# Patient Record
Sex: FEMALE | Race: OTHER | NOT HISPANIC OR LATINO
[De-identification: names, ages, dates, MRNs, and addresses within clinical notes are randomized per-mention and may not be internally consistent; named-entity substitution may affect disease eponyms.]

---

## 2017-09-29 ENCOUNTER — LABORATORY RESULT (OUTPATIENT)
Age: 57
End: 2017-09-29

## 2017-09-29 ENCOUNTER — OUTPATIENT (OUTPATIENT)
Dept: OUTPATIENT SERVICES | Facility: HOSPITAL | Age: 57
LOS: 1 days | End: 2017-09-29
Payer: SELF-PAY

## 2017-09-29 ENCOUNTER — APPOINTMENT (OUTPATIENT)
Dept: INTERNAL MEDICINE | Facility: CLINIC | Age: 57
End: 2017-09-29

## 2017-09-29 VITALS
SYSTOLIC BLOOD PRESSURE: 140 MMHG | DIASTOLIC BLOOD PRESSURE: 70 MMHG | WEIGHT: 170 LBS | BODY MASS INDEX: 39.34 KG/M2 | HEIGHT: 55 IN

## 2017-09-29 DIAGNOSIS — I10 ESSENTIAL (PRIMARY) HYPERTENSION: ICD-10-CM

## 2017-09-29 DIAGNOSIS — Z00.00 ENCOUNTER FOR GENERAL ADULT MEDICAL EXAMINATION W/OUT ABNORMAL FINDINGS: ICD-10-CM

## 2017-09-29 DIAGNOSIS — Z78.9 OTHER SPECIFIED HEALTH STATUS: ICD-10-CM

## 2017-09-29 PROCEDURE — G0463: CPT

## 2017-09-29 PROCEDURE — 80061 LIPID PANEL: CPT

## 2017-09-29 PROCEDURE — 90715 TDAP VACCINE 7 YRS/> IM: CPT

## 2017-09-29 PROCEDURE — G0008: CPT

## 2017-09-29 PROCEDURE — 90471 IMMUNIZATION ADMIN: CPT

## 2017-09-29 PROCEDURE — 80053 COMPREHEN METABOLIC PANEL: CPT

## 2017-09-29 PROCEDURE — 86803 HEPATITIS C AB TEST: CPT

## 2017-09-29 PROCEDURE — 90688 IIV4 VACCINE SPLT 0.5 ML IM: CPT

## 2017-09-29 PROCEDURE — 84443 ASSAY THYROID STIM HORMONE: CPT

## 2017-09-29 PROCEDURE — 85027 COMPLETE CBC AUTOMATED: CPT

## 2017-09-30 ENCOUNTER — INPATIENT (INPATIENT)
Facility: HOSPITAL | Age: 57
LOS: 1 days | Discharge: HOME | DRG: 842 | End: 2017-10-02
Attending: HOSPITALIST | Admitting: HOSPITALIST
Payer: MEDICAID

## 2017-09-30 VITALS
TEMPERATURE: 99 F | SYSTOLIC BLOOD PRESSURE: 127 MMHG | OXYGEN SATURATION: 98 % | DIASTOLIC BLOOD PRESSURE: 81 MMHG | HEART RATE: 77 BPM | RESPIRATION RATE: 20 BRPM

## 2017-09-30 DIAGNOSIS — D72.829 ELEVATED WHITE BLOOD CELL COUNT, UNSPECIFIED: ICD-10-CM

## 2017-09-30 DIAGNOSIS — D72.828 OTHER ELEVATED WHITE BLOOD CELL COUNT: ICD-10-CM

## 2017-09-30 DIAGNOSIS — Z29.9 ENCOUNTER FOR PROPHYLACTIC MEASURES, UNSPECIFIED: ICD-10-CM

## 2017-09-30 DIAGNOSIS — I10 ESSENTIAL (PRIMARY) HYPERTENSION: ICD-10-CM

## 2017-09-30 DIAGNOSIS — Z98.891 HISTORY OF UTERINE SCAR FROM PREVIOUS SURGERY: Chronic | ICD-10-CM

## 2017-09-30 DIAGNOSIS — C95.00 ACUTE LEUKEMIA OF UNSPECIFIED CELL TYPE NOT HAVING ACHIEVED REMISSION: ICD-10-CM

## 2017-09-30 LAB
ALBUMIN SERPL ELPH-MCNC: 4.6 G/DL — SIGNIFICANT CHANGE UP (ref 3.3–5)
ALBUMIN SERPL ELPH-MCNC: 4.6 G/DL — SIGNIFICANT CHANGE UP (ref 3.3–5)
ALP SERPL-CCNC: 69 U/L — SIGNIFICANT CHANGE UP (ref 40–120)
ALP SERPL-CCNC: 69 U/L — SIGNIFICANT CHANGE UP (ref 40–120)
ALT FLD-CCNC: 12 U/L — SIGNIFICANT CHANGE UP (ref 10–45)
ALT FLD-CCNC: 15 U/L RC — SIGNIFICANT CHANGE UP (ref 10–45)
ANION GAP SERPL CALC-SCNC: 14 MMOL/L — SIGNIFICANT CHANGE UP (ref 5–17)
ANION GAP SERPL CALC-SCNC: 18 MMOL/L — HIGH (ref 5–17)
APPEARANCE UR: CLEAR — SIGNIFICANT CHANGE UP
APTT BLD: 25.6 SEC — LOW (ref 27.5–37.4)
AST SERPL-CCNC: 22 U/L — SIGNIFICANT CHANGE UP (ref 10–40)
AST SERPL-CCNC: 33 U/L — SIGNIFICANT CHANGE UP (ref 10–40)
BASE EXCESS BLDV CALC-SCNC: 1.9 MMOL/L — SIGNIFICANT CHANGE UP (ref -2–2)
BASOPHILS # BLD AUTO: 0.9 K/UL — HIGH (ref 0–0.2)
BASOPHILS NFR BLD AUTO: 1 % — SIGNIFICANT CHANGE UP (ref 0–2)
BILIRUB SERPL-MCNC: 0.3 MG/DL — SIGNIFICANT CHANGE UP (ref 0.2–1.2)
BILIRUB SERPL-MCNC: 0.4 MG/DL — SIGNIFICANT CHANGE UP (ref 0.2–1.2)
BILIRUB UR-MCNC: NEGATIVE — SIGNIFICANT CHANGE UP
BUN SERPL-MCNC: 12 MG/DL — SIGNIFICANT CHANGE UP (ref 7–23)
BUN SERPL-MCNC: 14 MG/DL — SIGNIFICANT CHANGE UP (ref 7–23)
CA-I SERPL-SCNC: 1.25 MMOL/L — SIGNIFICANT CHANGE UP (ref 1.12–1.3)
CALCIUM SERPL-MCNC: 10 MG/DL — SIGNIFICANT CHANGE UP (ref 8.4–10.5)
CALCIUM SERPL-MCNC: 9.9 MG/DL — SIGNIFICANT CHANGE UP (ref 8.4–10.5)
CHLORIDE BLDV-SCNC: 106 MMOL/L — SIGNIFICANT CHANGE UP (ref 96–108)
CHLORIDE SERPL-SCNC: 100 MMOL/L — SIGNIFICANT CHANGE UP (ref 96–108)
CHLORIDE SERPL-SCNC: 101 MMOL/L — SIGNIFICANT CHANGE UP (ref 96–108)
CHOLEST SERPL-MCNC: 118 MG/DL — SIGNIFICANT CHANGE UP (ref 10–199)
CO2 BLDV-SCNC: 29 MMOL/L — SIGNIFICANT CHANGE UP (ref 22–30)
CO2 SERPL-SCNC: 22 MMOL/L — SIGNIFICANT CHANGE UP (ref 22–31)
CO2 SERPL-SCNC: 26 MMOL/L — SIGNIFICANT CHANGE UP (ref 22–31)
COLOR SPEC: COLORLESS — SIGNIFICANT CHANGE UP
COMMENT - URINE: SIGNIFICANT CHANGE UP
CREAT SERPL-MCNC: 0.7 MG/DL — SIGNIFICANT CHANGE UP (ref 0.5–1.3)
CREAT SERPL-MCNC: 0.74 MG/DL — SIGNIFICANT CHANGE UP (ref 0.5–1.3)
DIFF PNL FLD: NEGATIVE — SIGNIFICANT CHANGE UP
EOSINOPHIL # BLD AUTO: 0.7 K/UL — HIGH (ref 0–0.5)
EOSINOPHIL NFR BLD AUTO: 7 % — HIGH (ref 0–6)
EPI CELLS # UR: SIGNIFICANT CHANGE UP /HPF
FIBRINOGEN PPP-MCNC: 538 MG/DL — HIGH (ref 310–510)
GAS PNL BLDV: 139 MMOL/L — SIGNIFICANT CHANGE UP (ref 136–145)
GAS PNL BLDV: SIGNIFICANT CHANGE UP
GIANT PLATELETS BLD QL SMEAR: PRESENT — SIGNIFICANT CHANGE UP
GLUCOSE BLDV-MCNC: 99 MG/DL — SIGNIFICANT CHANGE UP (ref 70–99)
GLUCOSE SERPL-MCNC: 67 MG/DL — LOW (ref 70–99)
GLUCOSE SERPL-MCNC: 91 MG/DL — SIGNIFICANT CHANGE UP (ref 70–99)
GLUCOSE UR QL: NEGATIVE — SIGNIFICANT CHANGE UP
HCO3 BLDV-SCNC: 27 MMOL/L — SIGNIFICANT CHANGE UP (ref 21–29)
HCT VFR BLD CALC: 36.2 % — SIGNIFICANT CHANGE UP (ref 34.5–45)
HCT VFR BLD CALC: 36.3 % — SIGNIFICANT CHANGE UP (ref 34.5–45)
HCT VFR BLDA CALC: 34 % — LOW (ref 39–50)
HCV AB S/CO SERPL IA: 0.12 S/CO — SIGNIFICANT CHANGE UP
HCV AB SERPL-IMP: SIGNIFICANT CHANGE UP
HDLC SERPL-MCNC: 41 MG/DL — SIGNIFICANT CHANGE UP (ref 40–125)
HGB BLD CALC-MCNC: 11.2 G/DL — LOW (ref 11.5–15.5)
HGB BLD-MCNC: 11.2 G/DL — LOW (ref 11.5–15.5)
HGB BLD-MCNC: 11.5 G/DL — SIGNIFICANT CHANGE UP (ref 11.5–15.5)
INR BLD: 1.15 RATIO — SIGNIFICANT CHANGE UP (ref 0.88–1.16)
KETONES UR-MCNC: NEGATIVE — SIGNIFICANT CHANGE UP
LACTATE BLDV-MCNC: 1.3 MMOL/L — SIGNIFICANT CHANGE UP (ref 0.7–2)
LDH SERPL L TO P-CCNC: 794 U/L — HIGH (ref 50–242)
LEUKOCYTE ESTERASE UR-ACNC: NEGATIVE — SIGNIFICANT CHANGE UP
LIPID PNL WITH DIRECT LDL SERPL: 49 MG/DL — SIGNIFICANT CHANGE UP
LYMPHOCYTES # BLD AUTO: 13.9 K/UL — HIGH (ref 1–3.3)
LYMPHOCYTES # BLD AUTO: 4 % — LOW (ref 13–44)
MAGNESIUM SERPL-MCNC: 2 MG/DL — SIGNIFICANT CHANGE UP (ref 1.6–2.6)
MANUAL SMEAR VERIFICATION: SIGNIFICANT CHANGE UP
MCHC RBC-ENTMCNC: 27.6 PG — SIGNIFICANT CHANGE UP (ref 27–34)
MCHC RBC-ENTMCNC: 27.8 PG — SIGNIFICANT CHANGE UP (ref 27–34)
MCHC RBC-ENTMCNC: 30.9 GM/DL — LOW (ref 32–36)
MCHC RBC-ENTMCNC: 31.8 GM/DL — LOW (ref 32–36)
MCV RBC AUTO: 86.6 FL — SIGNIFICANT CHANGE UP (ref 80–100)
MCV RBC AUTO: 89.8 FL — SIGNIFICANT CHANGE UP (ref 80–100)
METAMYELOCYTES # FLD: 10 % — HIGH (ref 0–0)
MONOCYTES # BLD AUTO: 1.4 K/UL — HIGH (ref 0–0.9)
MONOCYTES NFR BLD AUTO: 2 % — SIGNIFICANT CHANGE UP (ref 2–14)
MYELOCYTES NFR BLD: 13 % — HIGH (ref 0–0)
NEUTROPHILS # BLD AUTO: 88 K/UL — HIGH (ref 1.8–7.4)
NEUTROPHILS NFR BLD AUTO: 48 % — SIGNIFICANT CHANGE UP (ref 43–77)
NEUTS BAND # BLD: 12 % — HIGH (ref 0–8)
NITRITE UR-MCNC: NEGATIVE — SIGNIFICANT CHANGE UP
PCO2 BLDV: 48 MMHG — SIGNIFICANT CHANGE UP (ref 35–50)
PH BLDV: 7.37 — SIGNIFICANT CHANGE UP (ref 7.35–7.45)
PH UR: 6 — SIGNIFICANT CHANGE UP (ref 5–8)
PHOSPHATE SERPL-MCNC: 3.7 MG/DL — SIGNIFICANT CHANGE UP (ref 2.5–4.5)
PLAT MORPH BLD: ABNORMAL
PLAT MORPH BLD: SIGNIFICANT CHANGE UP
PLATELET # BLD AUTO: 452 K/UL — HIGH (ref 150–400)
PLATELET # BLD AUTO: 661 K/UL — HIGH (ref 150–400)
PO2 BLDV: 27 MMHG — SIGNIFICANT CHANGE UP (ref 25–45)
POTASSIUM BLDV-SCNC: 4.4 MMOL/L — SIGNIFICANT CHANGE UP (ref 3.5–5)
POTASSIUM SERPL-MCNC: 4.4 MMOL/L — SIGNIFICANT CHANGE UP (ref 3.5–5.3)
POTASSIUM SERPL-MCNC: 4.7 MMOL/L — SIGNIFICANT CHANGE UP (ref 3.5–5.3)
POTASSIUM SERPL-SCNC: 4.4 MMOL/L — SIGNIFICANT CHANGE UP (ref 3.5–5.3)
POTASSIUM SERPL-SCNC: 4.7 MMOL/L — SIGNIFICANT CHANGE UP (ref 3.5–5.3)
PROMYELOCYTES # FLD: 3 % — HIGH (ref 0–0)
PROT SERPL-MCNC: 8 G/DL — SIGNIFICANT CHANGE UP (ref 6–8.3)
PROT SERPL-MCNC: 8.4 G/DL — HIGH (ref 6–8.3)
PROT UR-MCNC: NEGATIVE — SIGNIFICANT CHANGE UP
PROTHROM AB SERPL-ACNC: 12.6 SEC — SIGNIFICANT CHANGE UP (ref 9.8–12.7)
RBC # BLD: 4.03 M/UL — SIGNIFICANT CHANGE UP (ref 3.8–5.2)
RBC # BLD: 4.19 M/UL — SIGNIFICANT CHANGE UP (ref 3.8–5.2)
RBC # FLD: 15.8 % — HIGH (ref 10.3–14.5)
RBC # FLD: 18.4 % — HIGH (ref 10.3–14.5)
RBC BLD AUTO: SIGNIFICANT CHANGE UP
RBC BLD AUTO: SIGNIFICANT CHANGE UP
RBC CASTS # UR COMP ASSIST: SIGNIFICANT CHANGE UP /HPF (ref 0–2)
SAO2 % BLDV: 42 % — LOW (ref 67–88)
SODIUM SERPL-SCNC: 140 MMOL/L — SIGNIFICANT CHANGE UP (ref 135–145)
SODIUM SERPL-SCNC: 141 MMOL/L — SIGNIFICANT CHANGE UP (ref 135–145)
SP GR SPEC: 1 — LOW (ref 1.01–1.02)
TOTAL CHOLESTEROL/HDL RATIO MEASUREMENT: 2.9 RATIO — LOW (ref 3.3–7.1)
TRIGL SERPL-MCNC: 142 MG/DL — SIGNIFICANT CHANGE UP (ref 10–149)
TSH SERPL-MCNC: 2.03 UIU/ML — SIGNIFICANT CHANGE UP (ref 0.27–4.2)
URATE SERPL-MCNC: 7.1 MG/DL — HIGH (ref 2.5–7)
UROBILINOGEN FLD QL: NEGATIVE — SIGNIFICANT CHANGE UP
WBC # BLD: 105 K/UL — CRITICAL HIGH (ref 3.8–10.5)
WBC # BLD: 83.45 K/UL — CRITICAL HIGH (ref 3.8–10.5)
WBC # FLD AUTO: 105 K/UL — CRITICAL HIGH (ref 3.8–10.5)
WBC # FLD AUTO: 83.45 K/UL — CRITICAL HIGH (ref 3.8–10.5)
WBC UR QL: SIGNIFICANT CHANGE UP /HPF (ref 0–5)

## 2017-09-30 PROCEDURE — 99223 1ST HOSP IP/OBS HIGH 75: CPT | Mod: GC

## 2017-09-30 PROCEDURE — 99285 EMERGENCY DEPT VISIT HI MDM: CPT

## 2017-09-30 PROCEDURE — 99254 IP/OBS CNSLTJ NEW/EST MOD 60: CPT | Mod: GC

## 2017-09-30 PROCEDURE — 88189 FLOWCYTOMETRY/READ 16 & >: CPT

## 2017-09-30 RX ORDER — ALLOPURINOL 300 MG
300 TABLET ORAL DAILY
Qty: 0 | Refills: 0 | Status: DISCONTINUED | OUTPATIENT
Start: 2017-09-30 | End: 2017-10-01

## 2017-09-30 RX ORDER — ENOXAPARIN SODIUM 100 MG/ML
40 INJECTION SUBCUTANEOUS EVERY 24 HOURS
Qty: 0 | Refills: 0 | Status: DISCONTINUED | OUTPATIENT
Start: 2017-09-30 | End: 2017-10-02

## 2017-09-30 RX ORDER — SODIUM CHLORIDE 9 MG/ML
1000 INJECTION INTRAMUSCULAR; INTRAVENOUS; SUBCUTANEOUS
Qty: 0 | Refills: 0 | Status: DISCONTINUED | OUTPATIENT
Start: 2017-09-30 | End: 2017-10-02

## 2017-09-30 RX ORDER — SODIUM CHLORIDE 9 MG/ML
1000 INJECTION INTRAMUSCULAR; INTRAVENOUS; SUBCUTANEOUS ONCE
Qty: 0 | Refills: 0 | Status: COMPLETED | OUTPATIENT
Start: 2017-09-30 | End: 2017-09-30

## 2017-09-30 RX ORDER — LISINOPRIL 2.5 MG/1
1 TABLET ORAL
Qty: 0 | Refills: 0 | COMMUNITY

## 2017-09-30 RX ORDER — CAPTOPRIL 12.5 MG/1
0 TABLET ORAL
Qty: 0 | Refills: 0 | COMMUNITY

## 2017-09-30 RX ORDER — LISINOPRIL 2.5 MG/1
40 TABLET ORAL DAILY
Qty: 0 | Refills: 0 | Status: DISCONTINUED | OUTPATIENT
Start: 2017-09-30 | End: 2017-10-02

## 2017-09-30 RX ADMIN — ENOXAPARIN SODIUM 40 MILLIGRAM(S): 100 INJECTION SUBCUTANEOUS at 21:34

## 2017-09-30 RX ADMIN — SODIUM CHLORIDE 1000 MILLILITER(S): 9 INJECTION INTRAMUSCULAR; INTRAVENOUS; SUBCUTANEOUS at 11:38

## 2017-09-30 RX ADMIN — SODIUM CHLORIDE 75 MILLILITER(S): 9 INJECTION INTRAMUSCULAR; INTRAVENOUS; SUBCUTANEOUS at 21:34

## 2017-09-30 NOTE — ED ADULT NURSE NOTE - CHPI ED SYMPTOMS NEG
no fever/no decreased eating/drinking/no numbness/no pain/no tingling/no weakness/no nausea/no vomiting/no chills/no dizziness

## 2017-09-30 NOTE — CHART NOTE - NSCHARTNOTEFT_GEN_A_CORE
d/w Dr. Duggan of pathology. Preliminary flow cytometry results seem more consistent with CML. Normal fibrinogen noted. Plan for BM biopsy on Monday.

## 2017-09-30 NOTE — ED PROVIDER NOTE - OBJECTIVE STATEMENT
57 y.o. female with HTN, referred from the [facility] for elevated WBC on CBC. Pt was found to have a WBC of 83.45, no diff available. The patient has no acute complaints at this time. Had a headache earlier on in the week but has not had any issues since then. The pt denies fever, chills, night sweats, cough, nasal congestion, dyspnea, chest pain, palpations, nausea/vomiting, abd. pain, diarrhea, constipation, dysuria, urinary frequency, leg swelling, joint pain, and rashes.  No recent sick contacts.   [place] : [id number]

## 2017-09-30 NOTE — H&P ADULT - NSHPLABSRESULTS_GEN_ALL_CORE
11.5   105.0 )-----------( 452      ( 30 Sep 2017 11:40 )             36.3   PT/INR - ( 30 Sep 2017 11:40 )   PT: 12.6 sec;   INR: 1.15 ratio       PTT - ( 30 Sep 2017 11:40 )  PTT:25.6 sec    Fibrinogen Assay: 538    09-30    140  |  100  |  14  ----------------------------<  91  4.4   |  22  |  0.74    Ca    10.0      30 Sep 2017 11:40  Phos  3.7     09-30  Mg     2.0     09-30    TPro  8.4<H>  /  Alb  4.6  /  TBili  0.4  /  DBili  x   /  AST  33  /  ALT  15  /  AlkPhos  69  09-30 11.5   105.0 )-----------( 452      ( 30 Sep 2017 11:40 )             36.3   PT/INR - ( 30 Sep 2017 11:40 )   PT: 12.6 sec;   INR: 1.15 ratio       PTT - ( 30 Sep 2017 11:40 )  PTT:25.6 sec    Fibrinogen Assay: 538    09-30    140  |  100  |  14  ----------------------------<  91  4.4   |  22  |  0.74    Ca    10.0      30 Sep 2017 11:40  Phos  3.7     09-30  Mg     2.0     09-30    TPro  8.4<H>  /  Alb  4.6  /  TBili  0.4  /  DBili  x   /  AST  33  /  ALT  15  /  AlkPhos  69  09-30    Lactate Dehydrogenase, Serum: 794: Moderate hemolysis.  Results may be falsely elevated. U/L (09.30.17 @ 11:40)  Uric Acid, Serum: 7.1 mg/dL (09.30.17 @ 11:40)    Urinalysis + Microscopic Examination (09.30.17 @ 13:21)    pH Urine: 6.0    Leukocyte Esterase Concentration: Negative    Nitrite: Negative    Ketone - Urine: Negative    Bilirubin: Negative    Color: Colorless    Urine Appearance: Clear    Urobilinogen: Negative    Specific Gravity: 1.005    Protein, Urine: Negative    Glucose Qualitative, Urine: Negative    Blood, Urine: Negative    Red Blood Cell - Urine: 0-2 /HPF    White Blood Cell - Urine: 0-2 /HPF    Epithelial Cells: OCC /HPF    Comment - Urine: Few Mucus Strands

## 2017-09-30 NOTE — CONSULT NOTE ADULT - PROBLEM SELECTOR RECOMMENDATION 9
- diagnosis needs to be confirmed with BM biopsy  - will discuss with lab about performing urgent flow cytometry given presence of promyelocytes to r/o APL  - discussed with ER team, requested CT neck/chest given erythema/induration on exam, and requested fibrinogen level (PT/PTT not elevated)  - elevated LDH noted, please check tumor lysis labs LDH/uric acid/CMP/Phosphorus daily  - will consider treatment with ATRA,   - start IVF, allopurinol 300mg daily  - check TTE  - please check HIV/Hepatitis B testing (she was recently tested for HCV)  - will need HLA testing if leukemia confirmed  - would still rule out infectious process, check CT neck/chest, blood cultures  - patient currently asymptomatic, no neuro sx, although WBC count elevated, only small number of blasts, will hold off on leukophoresis and hydrea  - currently no female beds currently on [facility], I suspect patient will be transferred there tomorrow, admit to hospitalist    Aden Goldsmith, PGY4  Hematology/Oncology Fellow  pager: [phone]

## 2017-09-30 NOTE — ED PROVIDER NOTE - ATTENDING CONTRIBUTION TO CARE
57 yof pmhx htn went to medicine clinic recently for intermittent headaches she has been having over last few weeks. pt had basic bloodwork done and was called by clinic to return for high wbc. pt states has not had a headache in many days and is pain free at this time.     ROS:   constitutional - no fever, no chills  eyes - no visual changes, no redness  eent - no sore throat, no nasal congestion  cvs - no chest pain, no leg swelling  resp - no shortness of breath, no cough  gi - no abdominal pain, no vomiting, no diarrhea  gu - no dysuria, no hematuria  msk - no acute back pain, no joint swelling  skin - no rashes, no jaundice  neuro - +intermittent headache, no focal weakness  psych - no acute mental health issue     Physical Exam:   constitutional - well appearing, awake and alert, oriented x3  head - no external evidence of trauma  cvs - rrr, no murmurs, no peripheral edema  resp - breath sounds clear and equal bilat  gi - abdomen soft and nontender, no rigidity, guarding or rebound, bowel sounds present  msk - moving all extremities spontaneously  neuro - alert and oriented x3, no focal deficits, CNs 2-12 grossly intact  skin- no jaundice, warm and dry  psych - mood and affect wnl, no apparent risk to self or others     likely heme/onc eval, admit for further eval. ELIF Martins MD 57 yof pmhx htn went to medicine clinic recently for intermittent headaches she has been having over last few weeks. pt had basic bloodwork done and was called by clinic to return for high wbc. pt states has not had a headache in many days and is pain free at this time.     ROS:   constitutional - no fever, no chills  eyes - no visual changes, no redness  eent - no sore throat, no nasal congestion  cvs - no chest pain, no leg swelling  resp - no shortness of breath, no cough  gi - no abdominal pain, no vomiting, no diarrhea  gu - no dysuria, no hematuria  msk - no acute back pain, no joint swelling  skin - no rashes, no jaundice  neuro - +intermittent headache, no focal weakness  psych - no acute mental health issue     Physical Exam:   constitutional - well appearing, awake and alert, oriented x3  head - no external evidence of trauma  cvs - rrr, no murmurs, no peripheral edema  resp - breath sounds clear and equal bilat  gi - abdomen soft and nontender, no rigidity, guarding or rebound, bowel sounds present  msk - moving all extremities spontaneously  neuro - alert and oriented x3, no focal deficits, CNs 2-12 grossly intact  skin- no jaundice, warm and dry  psych - mood and affect wnl, no apparent risk to self or others     likely heme/onc eval, admit for further eval. pt is asymptomatic at this time. ELIF Martins MD

## 2017-09-30 NOTE — H&P ADULT - NSHPPHYSICALEXAM_GEN_ALL_CORE
PHYSICAL EXAM:    Constitutional: NAD, obese.   Eyes: EOMI, sclera clear white.   ENMT: MMM  Neck: No cervical lymphadenopathy, no thyromegaly.   Back: ?buffalo hump, tenderness on palpation of posterior shoulders.   Respiratory: CTABL.  Cardiovascular: No murmurs, no   Gastrointestinal: no HSM, soft, nontender.   Genitourinary: no CVAT, no suprapubic tenderness.   Extremities: No peripheral edema, warm with 2+ pulses in radial and DP pulses bilaterally.   Neurological: A&Ox3, grossly non-focal, walking around with no difficulty or gait abnormality.   Skin: no rashes, petechiae, or bruising.   Lymph Nodes: No cervical, axillary, or inguinal lymphadenopathy appreciated.   Psychiatric: euthymic.

## 2017-09-30 NOTE — H&P ADULT - PROBLEM SELECTOR PLAN 1
Leukomoid reaction vs. leukemia (given increased promyelocytes, can be APL)  - low suspicion for blast crisis.  - unlikely TLS, although LDH elevated. Appreciate heme/onc recs: Will trend LDH, uric acid, CMP, phos daily. IVF, allopurinol 300mg daily.   - will f/u CT neck/chest, BCx to rule out infectious etiology.   - baseline TTE prior to cardiotoxic agent treatment.   - BM biopsy to be done by Heme/Onc.   - will f/u HIV/Hep B Leukomoid reaction vs. leukemia (given increased promyelocytes, can be APL)  - low suspicion for blast crisis.  - unlikely TLS, although LDH elevated. Appreciate heme/onc recs: Will trend LDH, uric acid, CMP, phos daily. IVF, allopurinol 300mg daily.   - will f/u CT neck/chest, BCx to rule out infectious etiology. No fever, cough, SOB, diarrhea, chest pain or urinary symptoms.  - baseline TTE prior to cardiotoxic agent treatment.   - BM biopsy to be done by Heme/Onc.   - will f/u HIV/Hep B

## 2017-09-30 NOTE — CONSULT NOTE ADULT - ATTENDING COMMENTS
Pt with induration and tenderness over the posterior neck with mild erythema. She presents with leucopenia with predominance of neutrophils but with promyelocytes, myelocytes and few blasts on peripheral smear. She has normal coags and awaiting fibrinogen. Presumptive diagnosis may be CML but will see if flow can be obtained. Trend CBC. CT of the neck/ posterior chest to investigate patient's symptoms.

## 2017-09-30 NOTE — ED PROVIDER NOTE - MEDICAL DECISION MAKING DETAILS
57 y.o. F with HTN here for elevated WBC of 83, concerning for malignancy. Will repeat labs with cbc w/differential, CMP, coags, UA.

## 2017-09-30 NOTE — H&P ADULT - NSHPSOCIALHISTORY_GEN_ALL_CORE
Never smoker, denied EtOH use and all other recreational drug use. Lives with daughter's family. Works as [employer] for daughter's children. Recently immigrated from [facility] (1 year ago).

## 2017-09-30 NOTE — H&P ADULT - HISTORY OF PRESENT ILLNESS
56yo F hx of HTN presenting from clinic with leukocytosis found on routine labs at resident clinic ([address] [place] [address]). She denied any recent changes in health, including fatigue, weight loss, chills, fevers, night sweats, gum bleeds, hemorrhaging, rash. Also denying chest pain/pressure, palpitations, dyspnea. She also denied any recent illnesses or infections, denying cough, abdominal pain, diarrhea, dysuria, back pain. She denied any recent travel or sick contacts. Endorses some weight gain in past year since immigrating from [facility] but unsure of how much.

## 2017-09-30 NOTE — H&P ADULT - ATTENDING COMMENTS
Evaluated patient with R1/R2 resident on 9/30/17, agree with plan of care.  - Reviewed labs, CXR and Heme consult and plan  - Leukocytosis likely related to leukemia /leukemoid reaction. or any infectious cause. Panculture sent, CT c/a/p ordered  awaiting on Heme to do BM biopsy  - c/w IVF, allopurinol. F/U LDH, uric acid, HIV, hepatitis panel.  - will transfer patient to [address] [place] [address] (Heme floor) once bed available

## 2017-09-30 NOTE — CONSULT NOTE ADULT - SUBJECTIVE AND OBJECTIVE BOX
HPI: 57F PMH only of HTN who presents due to abnormal outpatient labs. Patient reports she has not had blood work in over a year. She immigrated from [facility] ~12 months ago. She denies any prior history of elevated WBC counts. She denies fevers, chills, nightsweats, weight loss, fatigue, easy bruising/bleeding.     Furthermore she denies any SOB, cough, nausea, vomiting, diarrhea, dysuria, rash, neck stiffness, photophobia. She does report an occipital headache yesterday but it has resolved since. Denies blurred vision, focal weakness/numbness.     She had a mammogram ~ 1 year ago and reports it was normal. Never had colonoscopy or PAP smear.     : #[id number]    Allergies    No Known Allergies    Intolerances    MEDICATIONS  (STANDING):    MEDICATIONS  (PRN):    PAST MEDICAL & SURGICAL HISTORY:  Hypertension  History of  section: x3    FAMILY HISTORY:  Family history of lung cancer (Father) diagnosed at 74  Denies history of cancer in any additional 1st degree relatives    SOCIAL HISTORY: No EtOH, no tobacco, no prior occupation, takes care of her grandchildren    REVIEW OF SYSTEMS:    CONSTITUTIONAL: No weakness, fevers or chills  EYES/ENT: No visual changes;  No vertigo or throat pain   NECK: No pain or stiffness  RESPIRATORY: No cough, wheezing, hemoptysis; No shortness of breath  CARDIOVASCULAR: No chest pain or palpitations  GASTROINTESTINAL: No abdominal or epigastric pain. No nausea, vomiting, or hematemesis; No diarrhea or constipation. No melena or hematochezia.  GENITOURINARY: No dysuria, frequency or hematuria  NEUROLOGICAL: No numbness or weakness  SKIN: No itching, burning, rashes, or lesions   All other review of systems is negative unless indicated above.    T(F): 98.6 (17 @ 11:34), Max: 98.6 (17 @ 10:41)  HR: 77 (17 @ 10:41)  BP: 127/81 (17 @ 10:41)  RR: 20 (17 @ 10:41)  SpO2: 98% (17 @ 10:41)  Wt(kg): --    GENERAL: NAD, well-developed, Obese  HEAD:  Atraumatic, Normocephalic, no thrush  EYES: EOMI, PERRLA, conjunctiva and sclera clear  NECK: warmth, redness and induration over the posterior neck and trapezius muscles, no blisters/pustules, no palpable lymphadenopathy  CHEST/LUNG: Clear to auscultation bilaterally; No wheeze  HEART: Regular rate and rhythm; No murmurs, rubs, or gallops  ABDOMEN: Soft, Nontender, Nondistended; Bowel sounds present, no palpable splenomegaly  EXTREMITIES:  2+ Peripheral Pulses, No clubbing, cyanosis, trace edema b/l  NEUROLOGY: non-focal  SKIN: No rashes or lesions                          11.5   105.0 )-----------( 452      ( 30 Sep 2017 11:40 )             36.3           140  |  100  |  14  ----------------------------<  91  4.4   |  22  |  0.74    Ca    10.0      30 Sep 2017 11:40  Phos  3.7       Mg     2.0         TPro  8.4<H>  /  Alb  4.6  /  TBili  0.4  /  DBili  x   /  AST  33  /  ALT  15  /  AlkPhos  69      Magnesium, Serum: 2.0 mg/dL ( @ 11:40)  Phosphorus Level, Serum: 3.7 mg/dL ( @ 11:40)  Uric Acid, Serum: 7.1 mg/dL ( @ 11:40)  Lactate Dehydrogenase, Serum: 794 U/L ( @ 11:40)    Peripheral smear reviewed with presence of occasional blasts, increased number of promyelocytes, myelocytes, and mature neutrophils

## 2017-10-01 ENCOUNTER — TRANSCRIPTION ENCOUNTER (OUTPATIENT)
Age: 57
End: 2017-10-01

## 2017-10-01 DIAGNOSIS — D72.829 ELEVATED WHITE BLOOD CELL COUNT, UNSPECIFIED: ICD-10-CM

## 2017-10-01 DIAGNOSIS — E16.2 HYPOGLYCEMIA, UNSPECIFIED: ICD-10-CM

## 2017-10-01 LAB
ALBUMIN SERPL ELPH-MCNC: 4.2 G/DL — SIGNIFICANT CHANGE UP (ref 3.3–5)
ALP SERPL-CCNC: 72 U/L — SIGNIFICANT CHANGE UP (ref 40–120)
ALT FLD-CCNC: 21 U/L — SIGNIFICANT CHANGE UP (ref 10–45)
ANION GAP SERPL CALC-SCNC: 19 MMOL/L — HIGH (ref 5–17)
AST SERPL-CCNC: 19 U/L — SIGNIFICANT CHANGE UP (ref 10–40)
BILIRUB SERPL-MCNC: 0.2 MG/DL — SIGNIFICANT CHANGE UP (ref 0.2–1.2)
BUN SERPL-MCNC: 15 MG/DL — SIGNIFICANT CHANGE UP (ref 7–23)
CALCIUM SERPL-MCNC: 8.8 MG/DL — SIGNIFICANT CHANGE UP (ref 8.4–10.5)
CHLORIDE SERPL-SCNC: 102 MMOL/L — SIGNIFICANT CHANGE UP (ref 96–108)
CO2 SERPL-SCNC: 22 MMOL/L — SIGNIFICANT CHANGE UP (ref 22–31)
CREAT SERPL-MCNC: 0.55 MG/DL — SIGNIFICANT CHANGE UP (ref 0.5–1.3)
CULTURE RESULTS: SIGNIFICANT CHANGE UP
FIBRINOGEN PPP-MCNC: 539 MG/DL — HIGH (ref 310–510)
GLUCOSE SERPL-MCNC: 30 MG/DL — CRITICAL LOW (ref 70–99)
HAV IGM SER-ACNC: SIGNIFICANT CHANGE UP
HBV CORE IGM SER-ACNC: SIGNIFICANT CHANGE UP
HBV SURFACE AG SER-ACNC: SIGNIFICANT CHANGE UP
HCT VFR BLD CALC: 33.1 % — LOW (ref 34.5–45)
HCV AB S/CO SERPL IA: 0.1 S/CO — SIGNIFICANT CHANGE UP
HCV AB SERPL-IMP: SIGNIFICANT CHANGE UP
HGB BLD-MCNC: 10.9 G/DL — LOW (ref 11.5–15.5)
HIV 1+2 AB+HIV1 P24 AG SERPL QL IA: SIGNIFICANT CHANGE UP
LDH SERPL L TO P-CCNC: 1173 U/L — HIGH (ref 50–242)
MAGNESIUM SERPL-MCNC: 2.1 MG/DL — SIGNIFICANT CHANGE UP (ref 1.6–2.6)
MCHC RBC-ENTMCNC: 28.4 PG — SIGNIFICANT CHANGE UP (ref 27–34)
MCHC RBC-ENTMCNC: 32.9 GM/DL — SIGNIFICANT CHANGE UP (ref 32–36)
MCV RBC AUTO: 86.2 FL — SIGNIFICANT CHANGE UP (ref 80–100)
PHOSPHATE SERPL-MCNC: 3.4 MG/DL — SIGNIFICANT CHANGE UP (ref 2.5–4.5)
PLATELET # BLD AUTO: 670 K/UL — HIGH (ref 150–400)
POTASSIUM SERPL-MCNC: 3.8 MMOL/L — SIGNIFICANT CHANGE UP (ref 3.5–5.3)
POTASSIUM SERPL-SCNC: 3.8 MMOL/L — SIGNIFICANT CHANGE UP (ref 3.5–5.3)
PROT SERPL-MCNC: 7.6 G/DL — SIGNIFICANT CHANGE UP (ref 6–8.3)
RBC # BLD: 3.84 M/UL — SIGNIFICANT CHANGE UP (ref 3.8–5.2)
RBC # FLD: 18 % — HIGH (ref 10.3–14.5)
SODIUM SERPL-SCNC: 143 MMOL/L — SIGNIFICANT CHANGE UP (ref 135–145)
SPECIMEN SOURCE: SIGNIFICANT CHANGE UP
URATE SERPL-MCNC: 6.6 MG/DL — SIGNIFICANT CHANGE UP (ref 2.5–7)
WBC # BLD: 90.26 K/UL — CRITICAL HIGH (ref 3.8–10.5)
WBC # FLD AUTO: 90.26 K/UL — CRITICAL HIGH (ref 3.8–10.5)

## 2017-10-01 PROCEDURE — 85060 BLOOD SMEAR INTERPRETATION: CPT

## 2017-10-01 PROCEDURE — 99233 SBSQ HOSP IP/OBS HIGH 50: CPT | Mod: GC

## 2017-10-01 PROCEDURE — 74177 CT ABD & PELVIS W/CONTRAST: CPT | Mod: 26

## 2017-10-01 PROCEDURE — 71260 CT THORAX DX C+: CPT | Mod: 26

## 2017-10-01 PROCEDURE — 70491 CT SOFT TISSUE NECK W/DYE: CPT | Mod: 26

## 2017-10-01 RX ADMIN — SODIUM CHLORIDE 75 MILLILITER(S): 9 INJECTION INTRAMUSCULAR; INTRAVENOUS; SUBCUTANEOUS at 22:04

## 2017-10-01 RX ADMIN — ENOXAPARIN SODIUM 40 MILLIGRAM(S): 100 INJECTION SUBCUTANEOUS at 22:04

## 2017-10-01 RX ADMIN — Medication 300 MILLIGRAM(S): at 13:16

## 2017-10-01 RX ADMIN — LISINOPRIL 40 MILLIGRAM(S): 2.5 TABLET ORAL at 05:16

## 2017-10-01 NOTE — PROGRESS NOTE ADULT - ASSESSMENT
She is a Tuvaluan speaking 58 y/o F who had incidental finding of leucocytosis at PCP office and referred to hospital for further evaluation.

## 2017-10-01 NOTE — PROGRESS NOTE ADULT - PROBLEM SELECTOR PLAN 1
Leukomoid reaction vs. leukemia (given increased promyelocytes, can be APL)  - low suspicion for blast crisis.  - unlikely TLS, although LDH elevated. Appreciate heme/onc recs: Will trend LDH, uric acid, CMP, phos daily. IVF, allopurinol 300mg daily.   - will f/u CT neck/chest, BCx to rule out infectious etiology.   - baseline TTE prior to cardiotoxic agent treatment.   - BM biopsy to be done by Heme/Onc.   - will f/u HIV/Hep B

## 2017-10-01 NOTE — DISCHARGE NOTE ADULT - CARE PLAN
Principal Discharge DX:	CML (chronic myeloid leukemia)  Goal:	Management  Instructions for follow-up, activity and diet:	You presented to the hospital after you were found to have an elevated white blood cell count at your primary care office. You did not show any symptoms. You were seen by the Hematology team. A CT scan showed enlarged lymph nodes in the abdomen and an enlarged spleen. You received a bone marrow biopsy. Please follow with your primary care doctor LORRI  Secondary Diagnosis:	Essential hypertension  Secondary Diagnosis:	Parotid nodule Principal Discharge DX:	CML (chronic myeloid leukemia)  Goal:	Management  Instructions for follow-up, activity and diet:	You presented to the hospital after you were found to have an elevated white blood cell count at your primary care office. You did not show any symptoms. You were seen by the Hematology team. A CT scan showed enlarged lymph nodes in the abdomen and an enlarged spleen. You received a bone marrow biopsy. Please follow up with your primary care doctor, Dr Jona Tavera at the [facility]. Please schedule an appointment with hematology, Dr Rangel Elmore, [phone], to discuss the bone marrow results and possible treatment options.  Secondary Diagnosis:	Essential hypertension  Goal:	Management  Instructions for follow-up, activity and diet:	You have high blood pressure. Please continue taking Lisinopril 40mg daily.  Secondary Diagnosis:	Parotid nodule Principal Discharge DX:	CML (chronic myeloid leukemia)  Goal:	Management  Instructions for follow-up, activity and diet:	You presented to the hospital after you were found to have an elevated white blood cell count at your primary care office. You did not show any symptoms. You were seen by the Hematology team. A CT scan showed enlarged lymph nodes in the abdomen and an enlarged spleen. You received a bone marrow biopsy. Please follow up with your primary care doctor, Dr Jona Tavera at the [facility]. Please schedule an appointment with [facility], [phone], to discuss the blood tests results and to schedule a bone marrow biopsy.  Secondary Diagnosis:	Essential hypertension  Goal:	Management  Instructions for follow-up, activity and diet:	You have high blood pressure. Please continue taking Lisinopril 40mg daily. Please follow up with your Primary Care Doctor, Dr Tavera within the next few weeks for continued management.  Secondary Diagnosis:	Parotid nodule  Goal:	Management  Instructions for follow-up, activity and diet:	You were found to have a 6mm parotid tumor in the Right parotid gland. You were evaluated by Interventional Radiology, Dr Rae. No biopsy was done due to the location near the facial nerve. Please follow up with your Primary Doctor for a repeat ultrasound of the parotids within 3-6 months to evaluate for growth.

## 2017-10-01 NOTE — PROGRESS NOTE ADULT - ATTENDING COMMENTS
Agree with R1/R2 plan of care.  Afebrile, no c/o SOB, chest pain, bleeding from anywhere  - Appreciated heme consult and plan  - Reviewed CT neck findings personally. Given there is a right parotid gland swelling will possibly need biopsy. Awaiting on CT chest and abdomen findings. Will speak to Heme/Onc  - Reviewed Labs, elevated WBC with left shift, LDH with normal Bun/Cr. c/w IVF, Allopurinol  - FSBS was 166, C- peptide, HbA1C sent Agree with R1/R2 plan of care.  Afebrile, no c/o SOB, chest pain, bleeding from anywhere  - Appreciated heme consult and plan. Spoke to Dr Rangel Rodriguez ([email or web address]). Flow cytometry shows likely CML, she will do BM biopsy tomorrow.  - Reviewed CT neck, c/a/p findings reviewed personally. Given there is a right parotid gland swelling/tumor spoke to ENT for biopsy prior to d/c. Heme/Onc kowns of the findings and splenomegaly.  - Reviewed Labs, elevated WBC with left shift, LDH with normal Bun/Cr. c/w IVF, Allopurinol.   - FSBS was 166, C- peptide, HbA1C sent

## 2017-10-01 NOTE — DISCHARGE NOTE ADULT - MEDICATION SUMMARY - MEDICATIONS TO STOP TAKING
I will STOP taking the medications listed below when I get home from the hospital:    captopril 50 mg oral tablet  -- 1 tab(s) by mouth 2 times a day

## 2017-10-01 NOTE — DISCHARGE NOTE ADULT - MEDICATION SUMMARY - MEDICATIONS TO TAKE
I will START or STAY ON the medications listed below when I get home from the hospital:    lisinopril 40 mg oral tablet  -- 1 tab(s) by mouth once a day  -- Indication: For Hypertension

## 2017-10-01 NOTE — DISCHARGE NOTE ADULT - PATIENT PORTAL LINK FT
“You can access the FollowHealth Patient Portal, offered by [facility], by registering with the following website: [email or web address]”

## 2017-10-01 NOTE — PROVIDER CONTACT NOTE (CRITICAL VALUE NOTIFICATION) - ASSESSMENT
Pt. admitted with elevated WBC= pt. has been having tests done.
Pt. alert oriented and ambulating- results likely inaccurate

## 2017-10-01 NOTE — DISCHARGE NOTE ADULT - PLAN OF CARE
Management You presented to the hospital after you were found to have an elevated white blood cell count at your primary care office. You did not show any symptoms. You were seen by the Hematology team. A CT scan showed enlarged lymph nodes in the abdomen and an enlarged spleen. You received a bone marrow biopsy. Please follow with your primary care doctor LORRI You presented to the hospital after you were found to have an elevated white blood cell count at your primary care office. You did not show any symptoms. You were seen by the Hematology team. A CT scan showed enlarged lymph nodes in the abdomen and an enlarged spleen. You received a bone marrow biopsy. Please follow up with your primary care doctor, Dr Jona Tavera at the [facility]. Please schedule an appointment with hematology, Dr Rangel Elmore, [phone], to discuss the bone marrow results and possible treatment options. You have high blood pressure. Please continue taking Lisinopril 40mg daily. You presented to the hospital after you were found to have an elevated white blood cell count at your primary care office. You did not show any symptoms. You were seen by the Hematology team. A CT scan showed enlarged lymph nodes in the abdomen and an enlarged spleen. You received a bone marrow biopsy. Please follow up with your primary care doctor, Dr Jona Tavera at the [facility]. Please schedule an appointment with [facility] hematology, [phone], to discuss the blood tests results and to schedule a bone marrow biopsy. You have high blood pressure. Please continue taking Lisinopril 40mg daily. Please follow up with your Primary Care Doctor, Dr Tavera within the next few weeks for continued management. You were found to have a 6mm parotid tumor in the Right parotid gland. You were evaluated by Interventional Radiology, Dr Rae. No biopsy was done due to the location near the facial nerve. Please follow up with your Primary Doctor for a repeat ultrasound of the parotids within 3-6 months to evaluate for growth.

## 2017-10-01 NOTE — CHART NOTE - NSCHARTNOTEFT_GEN_A_CORE
Called about BMP with glucose 30. Lab error: .    Ghanshyam Dumont MD   PGY1  Medicine Team 4  Pager: [phone]

## 2017-10-01 NOTE — DISCHARGE NOTE ADULT - ADDITIONAL INSTRUCTIONS
Please follow up with your Primary Care Doctor and Hematology,  ---- Please follow up with [facility], [phone] to schedule an appointment to discuss the blood tests results and for a bone marrow biopsy.  Please follow up with your Primary Care Doctor, Dr Tavera, [phone], at [address] [place] [address] for follow up of your hypertension. Please follow up with your Primary Care Doctor in 3-6 months for a repeat ultrasound of your parotid glands.     Esta diagnosticado con [facility]. Por Favor, llame [facility], [phone] para jared tucker con los doctores de hematologia.   Por favor llame la clinica de medicina para jared tucker con nichols doctor primario, Dr Tavera, [phone], en [address] [place] [address]. Por favor zechariah jared tucker en 3-6 meses para evaluar nichols glandula parotida con ultrasonido.

## 2017-10-01 NOTE — DISCHARGE NOTE ADULT - CARE PROVIDER_API CALL
Jona Tavera  Phone: [phone]  Fax: (   )    -    Rangel Elmore (MD), Hematology; Medical Oncology  [address] [place] [address]  Phone: [phone]  Fax: [phone]

## 2017-10-01 NOTE — PROGRESS NOTE ADULT - PROBLEM SELECTOR PLAN 1
She remains asymptomatic with counts and clinical picture consistent with CML. With , we reviewed bone marrow biopsy and next steps for further evaluation of the leukocytosis and the potential treatment. She is agreeable to evaluation. She remains asymptomatic with counts and clinical picture consistent with CML. With , we reviewed bone marrow biopsy and next steps for further evaluation of the leukocytosis and the potential treatment. She is agreeable to evaluation. No significant adenopathy on CT and normal uric acid. Will discontinue allopurinol.

## 2017-10-01 NOTE — DISCHARGE NOTE ADULT - HOSPITAL COURSE
HPI: Ms. Maldonado is a 57-year-old lady with a history of HTN presenting from clinic with leukocytosis found on routine labs at resident clinic ([address] [place] [address]). She denied any recent changes in health, including fatigue, weight loss, chills, fevers, night sweats, gum bleeds, hemorrhaging, rash. Also denying chest pain/pressure, palpitations, dyspnea. She also denied any recent illnesses or infections, denying cough, abdominal pain, diarrhea, dysuria, back pain. She denied any recent travel or sick contacts. Endorses some weight gain in past year since immigrating from [facility] but unsure of how much.    Hospital course: She was admitted under the general medicine team for presumed diagnosis of CML. While more likely a leukemic process, she was worked up for a leukomoid/infectious process through pan-scans of her neck, chest, abdomen, which were negative for infectious source. She received a bone marrow biopsy on HD#2. BCR-ABL and Jose-2 markers were sent as a serological work up. Throughout her course, BMP, LDH, uric acid was trended to rule out tumor lysis syndrome. While uric acid and LDH was mildly elevated on admission, there were no signs that she was in TLS. HPI: Ms. Maldonado is a 57-year-old lady with a history of HTN presenting from clinic with leukocytosis found on routine labs at resident clinic ([address] [place] [address]). She denied any recent changes in health, including fatigue, weight loss, chills, fevers, night sweats, gum bleeds, hemorrhaging, rash. Also denying chest pain/pressure, palpitations, dyspnea. She also denied any recent illnesses or infections, denying cough, abdominal pain, diarrhea, dysuria, back pain. She denied any recent travel or sick contacts. Endorses some weight gain in past year since immigrating from [facility] but unsure of how much.    Hospital course: She was admitted under the general medicine team for presumed diagnosis of CML. While more likely a leukemic process, she was worked up for a leukomoid/infectious process through pan-scans of her neck, chest, abdomen, which were negative for infectious source and did not show any significant lymphadenopathy. She received a bone marrow biopsy on HD#2. BCR-ABL and Jose-2 markers were sent as a serological work up. Throughout her course, BMP, LDH, uric acid was trended to rule out tumor lysis syndrome. While uric acid and LDH was mildly elevated on admission, there were no signs that she was in TLS. However, she was initially placed on allopurinol 300 daily, which was discontinued on HD#2. HPI: Ms. Maldonado is a 57-year-old lady with a history of HTN presenting from clinic with leukocytosis found on routine labs at resident clinic ([address] [place] [address]). She denied any recent changes in health, including fatigue, weight loss, chills, fevers, night sweats, gum bleeds, hemorrhaging, rash. Also denying chest pain/pressure, palpitations, dyspnea. She also denied any recent illnesses or infections, denying cough, abdominal pain, diarrhea, dysuria, back pain. She denied any recent travel or sick contacts. Endorses some weight gain in past year since immigrating from [facility] but unsure of how much.    Hospital course: She was admitted under the general medicine team for presumed diagnosis of CML (initial flow cytometry and left shift more suggestive of CML). While more likely a leukemic process, she was worked up for a leukomoid/infectious process through pan-scans of her neck, chest, abdomen, which were negative for infectious source and did not show any significant lymphadenopathy, although with splenomegaly. 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 She received a bone marrow biopsy on HD#2. BCR-ABL and Jose-2 markers were sent as a serological work up. Throughout her course, BMP, LDH, uric acid was trended to rule out tumor lysis syndrome. While uric acid and LDH was mildly elevated on admission, there were no signs that she was in TLS. However, she was initially placed on allopurinol 300 daily, which was discontinued on HD#2, as well as NS IVF. TTE was also ordered for clearance prior to starting cardiotoxic agents, which showed -----. HPI: Ms. Maldonado is a 57-year-old lady with a history of HTN presenting from clinic with leukocytosis to 80s found on routine labs at resident clinic ([address] [place] [address]). She denied any recent changes in health, including fatigue, weight loss, chills, fevers, night sweats, gum bleeds, hemorrhaging, rash. Also denying chest pain/pressure, palpitations, dyspnea. She also denied any recent illnesses or infections, denying cough, abdominal pain, diarrhea, dysuria, back pain. She denied any recent travel or sick contacts. Endorses some weight gain in past year since immigrating from [facility] but unsure of how much.    Hospital course: She was admitted under the general medicine team for presumed diagnosis of CML (initial flow cytometry and left shift more suggestive of CML). While more likely a leukemic process, she was worked up for a leukomoid/infectious process through pan-scans of her neck, chest, abdomen, which were negative for infectious source and did not show any significant lymphadenopathy, although with splenomegaly. 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 She received a bone marrow biopsy on HD#2. BCR-ABL and Jose-2 markers were sent as a serological work up. Throughout her course, BMP, LDH, uric acid, phos was trended to rule out tumor lysis syndrome. While uric acid and LDH was mildly elevated on admission, there were no signs that she was in TLS. However, she was initially placed on allopurinol 300 daily, which was discontinued on HD#2, as well as NS IVF. TTE was also ordered for clearance prior to starting cardiotoxic agents, which showed -----. HPI: Ms. Maldonado is a 57-year-old lady with a history of HTN presenting from clinic with leukocytosis to 80s found on routine labs at resident clinic ([address] [place] [address]). She denied any recent changes in health, including fatigue, weight loss, chills, fevers, night sweats, gum bleeds, hemorrhaging, rash. Also denying chest pain/pressure, palpitations, dyspnea. She also denied any recent illnesses or infections, denying cough, abdominal pain, diarrhea, dysuria, back pain. She denied any recent travel or sick contacts. Endorses some weight gain in past year since immigrating from [facility] but unsure of how much.    Hospital course: She was admitted under the general medicine team for presumed diagnosis of CML (initial flow cytometry and left shift more suggestive of CML). While more likely a leukemic process, she was worked up for a leukomoid/infectious process through pan-scans of her neck, chest, abdomen, which were negative for infectious source, although with abnormal aortocaval adenopathy and splenomegaly. 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 She received a bone marrow biopsy on HD#2. BCR-ABL and Jose-2 markers were sent as a serological work up. Throughout her course, BMP, LDH, uric acid, phos was trended to rule out tumor lysis syndrome. While uric acid and LDH was mildly elevated on admission, there were no signs that she was in TLS. However, she was initially placed on allopurinol 300 daily, which was discontinued on HD#2, as well as NS IVF. TTE was also ordered for clearance prior to starting cardiotoxic agents, which showed -----. HPI: Ms. Maldonado is a 57-year-old lady with a history of HTN presenting from clinic with leukocytosis to 80s found on routine labs at resident clinic ([address] [place] [address]). She denied any recent changes in health, including fatigue, weight loss, chills, fevers, night sweats, gum bleeds, hemorrhaging, rash. Also denying chest pain/pressure, palpitations, dyspnea. She also denied any recent illnesses or infections, denying cough, abdominal pain, diarrhea, dysuria, back pain. She denied any recent travel or sick contacts. Endorses some weight gain in past year since immigrating from [facility] but unsure of how much.    Hospital course: She was admitted under the general medicine team for presumed diagnosis of CML (initial flow cytometry and left shift more suggestive of CML). While more likely a leukemic process, she was worked up for a leukomoid/infectious process through pan-scans of her neck, chest, abdomen, which were negative for infectious source, although with abnormal aortocaval adenopathy and splenomegaly. 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 She received a bone marrow biopsy on HD#2. BCR-ABL and Jose-2 markers were sent as a serological work up. Throughout her course, BMP, LDH, uric acid, phos was trended to rule out tumor lysis syndrome. While uric acid and LDH was mildly elevated on admission, there were no signs that she was in TLS. However, she was initially placed on allopurinol 300 daily, which was discontinued on HD#2, as well as NS IVF. IR was consulted for evaluation of a parotid mass. U/S showed a 6mm parotid tumor, Concern for sialocele or Warthins tumor, but biopsy was deferred due to the location. The patient was instructed to follow up the parotid gland with outpatient ultrasound in 3-6 months with her primary doctor. 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 The patient was medically optimized for discharge with close follow up with the [facility], for continued outpatient evaluation and for Bone Marrow Biopsy. HPI: Ms. Maldonado is a 57-year-old lady with a history of HTN presenting from clinic with leukocytosis to 80s found on routine labs at resident clinic ([address] [place] [address]). She denied any recent changes in health, including fatigue, weight loss, chills, fevers, night sweats, gum bleeds, hemorrhaging, rash. Also denying chest pain/pressure, palpitations, dyspnea. She also denied any recent illnesses or infections, denying cough, abdominal pain, diarrhea, dysuria, back pain. She denied any recent travel or sick contacts. Endorses some weight gain in past year since immigrating from [facility] but unsure of how much.    Hospital course: She was admitted under the general medicine team for presumed diagnosis of CML (initial flow cytometry and left shift more suggestive of CML). While more likely a leukemic process, she was worked up for a leukomoid/infectious process through pan-scans of her neck, chest, abdomen, which were negative for infectious source, although with abnormal aortocaval adenopathy and splenomegaly. 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 She received a bone marrow biopsy on HD#2. BCR-ABL and Jose-2 markers were sent as a serological work up. Throughout her course, BMP, LDH, uric acid, phos was trended to rule out tumor lysis syndrome. While uric acid and LDH was mildly elevated on admission, there were no signs that she was in TLS. However, she was initially placed on allopurinol 300 daily, which was discontinued on HD#2, as well as NS IVF. IR was consulted for evaluation of a parotid mass. U/S showed a 6mm parotid tumor, Concern for sialocele or Warthins tumor, but biopsy was deferred due to the location. The patient was instructed to follow up the parotid gland with outpatient ultrasound in 3-6 months with her primary doctor. 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 The patient was medically optimized for discharge with close follow up with the [facility], for continued outpatient evaluation and for Bone Marrow Biopsy.   Discharge time: HPI: Ms. Maldonado is a 57-year-old lady with a history of HTN presenting from clinic with leukocytosis to 80s found on routine labs at resident clinic ([address] [place] [address]). She denied any recent changes in health, including fatigue, weight loss, chills, fevers, night sweats, gum bleeds, hemorrhaging, rash. Also denying chest pain/pressure, palpitations, dyspnea. She also denied any recent illnesses or infections, denying cough, abdominal pain, diarrhea, dysuria, back pain. She denied any recent travel or sick contacts. Endorses some weight gain in past year since immigrating from [facility] but unsure of how much.    Hospital course: She was admitted under the general medicine team for presumed diagnosis of CML (initial flow cytometry and left shift more suggestive of CML). While more likely a leukemic process, she was worked up for a leukomoid/infectious process through pan-scans of her neck, chest, abdomen, which were negative for infectious source, although with abnormal aortocaval adenopathy and splenomegaly. 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 She received a bone marrow biopsy on HD#2. BCR-ABL and Jose-2 markers were sent as a serological work up. Throughout her course, BMP, LDH, uric acid, phos was trended to rule out tumor lysis syndrome. While uric acid and LDH was mildly elevated on admission, there were no signs that she was in TLS. However, she was initially placed on allopurinol 300 daily, which was discontinued on HD#2, as well as NS IVF. IR was consulted for evaluation of a parotid mass. U/S showed a 6mm parotid tumor, Concern for sialocele or Warthins tumor, but biopsy was deferred due to the location. The patient was instructed to follow up the parotid gland with outpatient ultrasound in 3-6 months with her primary doctor. 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 The patient was medically optimized for discharge with close follow up with the [facility], for continued outpatient evaluation and for Bone Marrow Biopsy.   Discharge time: 42 min

## 2017-10-01 NOTE — PROGRESS NOTE ADULT - SUBJECTIVE AND OBJECTIVE BOX
NATY JUAN M  57y  Female    Patient is a 57y old  Female who presents with a chief complaint of Leukocytosis (30 Sep 2017 18:29)      INTERVAL HPI/OVERNIGHT EVENTS:    REVIEW OF SYSTEMS:    T(C): 36.3 (17 @ 22:15), Max: 37 (17 @ 10:41)  HR: 69 (10-01-17 @ 05:16) (69 - 81)  BP: 111/61 (10-01-17 @ 05:16) (111/61 - 171/84)  RR: 18 (17 @ 22:15) (18 - 20)  SpO2: 98% (17 @ 22:15) (98% - 98%)  Wt(kg): --Vital Signs Last 24 Hrs  T(C): 36.3 (30 Sep 2017 22:15), Max: 37 (30 Sep 2017 10:41)  T(F): 97.4 (30 Sep 2017 22:15), Max: 98.6 (30 Sep 2017 10:41)  HR: 69 (01 Oct 2017 05:16) (69 - 81)  BP: 111/61 (01 Oct 2017 05:16) (111/61 - 171/84)  BP(mean): --  RR: 18 (30 Sep 2017 22:15) (18 - 20)  SpO2: 98% (30 Sep 2017 22:15) (98% - 98%)  Wt(kg): --    PHYSICAL EXAM:  GENERAL: NAD, well-groomed, well-developed  HEAD:  Atraumatic, Normocephalic  EYES: EOMI, PERRLA, conjunctiva and sclera clear  ENMT: No tonsillar erythema, exudates, or enlargement; Moist mucous membranes, Good dentition, No lesions  NECK: Supple, No JVD, Normal thyroid  NERVOUS SYSTEM:  Alert & Oriented X3, Good concentration; Motor Strength 5/5 B/L upper and lower extremities; DTRs 2+ intact and symmetric  CHEST/LUNG: Clear to percussion bilaterally; No rales, rhonchi, wheezing, or rubs  HEART: Regular rate and rhythm; No murmurs, rubs, or gallops  ABDOMEN: Soft, Nontender, Nondistended; Bowel sounds present  EXTREMITIES:  2+ Peripheral Pulses, No clubbing, cyanosis, or edema  LYMPH: No lymphadenopathy noted  SKIN: No rashes or lesions    Consultant(s) Notes Reviewed:  [x ] YES  [ ] NO  Care Discussed with Consultants/Other Providers [ x] YES  [ ] NO    LABS:                        11.5   105.0 )-----------( 452      ( 30 Sep 2017 11:40 )             36.3         140  |  100  |  14  ----------------------------<  91  4.4   |  22  |  0.74    Ca    10.0      30 Sep 2017 11:40  Phos  3.7       Mg     2.0         TPro  8.4<H>  /  Alb  4.6  /  TBili  0.4  /  DBili  x   /  AST  33  /  ALT  15  /  AlkPhos  69      PT/INR - ( 30 Sep 2017 11:40 )   PT: 12.6 sec;   INR: 1.15 ratio         PTT - ( 30 Sep 2017 11:40 )  PTT:25.6 sec  Urinalysis Basic - ( 30 Sep 2017 13:21 )    Color: x / Appearance: Clear / S.005 / pH: x  Gluc: x / Ketone: Negative  / Bili: Negative / Urobili: Negative   Blood: x / Protein: Negative / Nitrite: Negative   Leuk Esterase: Negative / RBC: 0-2 /HPF / WBC 0-2 /HPF   Sq Epi: x / Non Sq Epi: OCC /HPF / Bacteria: x      CAPILLARY BLOOD GLUCOSE            Urinalysis Basic - ( 30 Sep 2017 13:21 )    Color: x / Appearance: Clear / S.005 / pH: x  Gluc: x / Ketone: Negative  / Bili: Negative / Urobili: Negative   Blood: x / Protein: Negative / Nitrite: Negative   Leuk Esterase: Negative / RBC: 0-2 /HPF / WBC 0-2 /HPF   Sq Epi: x / Non Sq Epi: OCC /HPF / Bacteria: x        RADIOLOGY & ADDITIONAL TESTS:    Imaging Personally Reviewed:  [ ] YES  [ ] NO    HEALTH ISSUES - PROBLEM Dx:  Need for prophylactic measure: Need for prophylactic measure  Hypertension: Hypertension  Leukocytosis: Leukocytosis  Leukemia, acute: Leukemia, acute JUAN M ALFONSO  Patient is a 57y old  Female who presents with a chief complaint of Leukocytosis (30 Sep 2017 18:29)    INTERVAL HPI/OVERNIGHT EVENTS: No acute overnight events.     REVIEW OF SYSTEMS: Denied CP, SOB, fever/chills, abdominal pain. 14-point ROS otherwise negative.     T(C): 36.3 (17 @ 22:15), Max: 37 (17 @ 10:41)  HR: 69 (10-01-17 @ 05:16) (69 - 81)  BP: 111/61 (10-01-17 @ 05:16) (111/61 - 171/84)  RR: 18 (17 @ 22:15) (18 - 20)  SpO2: 98% (17 @ 22:15) (98% - 98%)  Wt(kg): --Vital Signs Last 24 Hrs  T(C): 36.3 (30 Sep 2017 22:15), Max: 37 (30 Sep 2017 10:41)  T(F): 97.4 (30 Sep 2017 22:15), Max: 98.6 (30 Sep 2017 10:41)  HR: 69 (01 Oct 2017 05:16) (69 - 81)  BP: 111/61 (01 Oct 2017 05:16) (111/61 - 171/84)  BP(mean): --  RR: 18 (30 Sep 2017 22:15) (18 - 20)  SpO2: 98% (30 Sep 2017 22:15) (98% - 98%)  Wt(kg): --    PHYSICAL EXAM:  Constitutional: NAD, obese.   Eyes: EOMI, sclera clear white.   ENMT: MMM  Neck: No cervical lymphadenopathy, no thyromegaly.   Back: ?buffalo hump, tenderness on palpation of posterior shoulders.   Respiratory: CTABL.  Cardiovascular: No murmurs, no   Gastrointestinal: no HSM, soft, nontender.   Genitourinary: no CVAT, no suprapubic tenderness.   Extremities: No peripheral edema, warm with 2+ pulses in radial and DP pulses bilaterally.   Neurological: A&Ox3, grossly non-focal, walking around with no difficulty or gait abnormality.   Skin: no rashes, petechiae, or bruising.   Lymph Nodes: No cervical, axillary, or inguinal lymphadenopathy appreciated.   Psychiatric: euthymic.    Consultant(s) Notes Reviewed:  [x ] YES  [ ] NO  Care Discussed with Consultants/Other Providers [ x] YES  [ ] NO    LABS:                        11.5   105.0 )-----------( 452      ( 30 Sep 2017 11:40 )             36.3         140  |  100  |  14  ----------------------------<  91  4.4   |  22  |  0.74    Ca    10.0      30 Sep 2017 11:40  Phos  3.7       Mg     2.0         TPro  8.4<H>  /  Alb  4.6  /  TBili  0.4  /  DBili  x   /  AST  33  /  ALT  15  /  AlkPhos  69      PT/INR - ( 30 Sep 2017 11:40 )   PT: 12.6 sec;   INR: 1.15 ratio         PTT - ( 30 Sep 2017 11:40 )  PTT:25.6 sec  Urinalysis Basic - ( 30 Sep 2017 13:21 )    Color: x / Appearance: Clear / S.005 / pH: x  Gluc: x / Ketone: Negative  / Bili: Negative / Urobili: Negative   Blood: x / Protein: Negative / Nitrite: Negative   Leuk Esterase: Negative / RBC: 0-2 /HPF / WBC 0-2 /HPF   Sq Epi: x / Non Sq Epi: OCC /HPF / Bacteria: x    Urinalysis Basic - ( 30 Sep 2017 13:21 )    Color: x / Appearance: Clear / S.005 / pH: x  Gluc: x / Ketone: Negative  / Bili: Negative / Urobili: Negative   Blood: x / Protein: Negative / Nitrite: Negative   Leuk Esterase: Negative / RBC: 0-2 /HPF / WBC 0-2 /HPF   Sq Epi: x / Non Sq Epi: OCC /HPF / Bacteria: x    RADIOLOGY & ADDITIONAL TESTS:    Imaging Personally Reviewed:  [ ] YES  [ ] NO    HEALTH ISSUES - PROBLEM Dx:  Need for prophylactic measure: Need for prophylactic measure  Hypertension: Hypertension  Leukocytosis: Leukocytosis  Leukemia, acute: Leukemia, acute

## 2017-10-01 NOTE — PROGRESS NOTE ADULT - SUBJECTIVE AND OBJECTIVE BOX
Hematology Follow-up ([facility] : [id number])    INTERVAL HPI/OVERNIGHT EVENTS: She denies any fevers or chills overnight. No pain in the neck or chest wall.     VITAL SIGNS:  T(F): 98.1 (10-01-17 @ 07:41)  HR: 64 (10-01-17 @ 07:41)  BP: 112/69 (10-01-17 @ 07:41)  RR: 18 (10-01-17 @ 07:41)  SpO2: 99% (10-01-17 @ 07:41)  Wt(kg): --    PHYSICAL EXAM:    Constitutional: AAOx3, NAD,   Eyes: PERRL, EOMI, sclera non-icteric  Neck: erythema from posterior neck resolved   Respiratory: CTAB  Cardiovascular: RRR, normal S1S2, no M/R/G  Gastrointestinal: soft, NTND, NT  Extremities:  no c/c/e  Skin: No rash    MEDICATIONS  (STANDING):  allopurinol 300 milliGRAM(s) Oral daily  enoxaparin Injectable 40 milliGRAM(s) SubCutaneous every 24 hours  sodium chloride 0.9%. 1000 milliLiter(s) (75 mL/Hr) IV Continuous <Continuous>  lisinopril 40 milliGRAM(s) Oral daily    MEDICATIONS  (PRN):      No Known Allergies      LABS:                        10.9   90.26 )-----------( 670      ( 01 Oct 2017 09:05 )             33.1     10-01    143  |  102  |  15  ----------------------------<  30<LL>  3.8   |  22  |  0.55    Ca    8.8      01 Oct 2017 09:05  Phos  3.4     10-01  Mg     2.1     10-01    TPro  7.6  /  Alb  4.2  /  TBili  0.2  /  DBili  x   /  AST  19  /  ALT  21  /  AlkPhos  72  10-01    PT/INR - ( 30 Sep 2017 11:40 )   PT: 12.6 sec;   INR: 1.15 ratio         PTT - ( 30 Sep 2017 11:40 )  PTT:25.6 sec Lactate Dehydrogenase, Serum: 1173 U/L (10-01 @ 09:05)  Fibrinogen Assay: 539 mg/dL (10-01 @ 09:05)  Fibrinogen Assay: 538 mg/dL ( @ 18:18)    Urinalysis Basic - ( 30 Sep 2017 13:21 )    Color: x / Appearance: Clear / S.005 / pH: x  Gluc: x / Ketone: Negative  / Bili: Negative / Urobili: Negative   Blood: x / Protein: Negative / Nitrite: Negative   Leuk Esterase: Negative / RBC: 0-2 /HPF / WBC 0-2 /HPF   Sq Epi: x / Non Sq Epi: OCC /HPF / Bacteria: x        RADIOLOGY & ADDITIONAL TESTS:  Studies reviewed.

## 2017-10-02 VITALS
SYSTOLIC BLOOD PRESSURE: 134 MMHG | RESPIRATION RATE: 18 BRPM | OXYGEN SATURATION: 98 % | HEART RATE: 71 BPM | TEMPERATURE: 99 F | DIASTOLIC BLOOD PRESSURE: 76 MMHG

## 2017-10-02 LAB
ANION GAP SERPL CALC-SCNC: 14 MMOL/L — SIGNIFICANT CHANGE UP (ref 5–17)
BASOPHILS # BLD AUTO: 6.54 K/UL — HIGH (ref 0–0.2)
BASOPHILS # BLD AUTO: 7.22 K/UL — HIGH (ref 0–0.2)
BASOPHILS NFR BLD AUTO: 8 % — HIGH (ref 0–2)
BASOPHILS NFR BLD AUTO: 8 % — HIGH (ref 0–2)
BLASTS # FLD: 3 % — HIGH (ref 0–0)
BLASTS # FLD: 3 % — HIGH (ref 0–0)
BUN SERPL-MCNC: 11 MG/DL — SIGNIFICANT CHANGE UP (ref 7–23)
C PEPTIDE SERPL-MCNC: 2.1 NG/ML — SIGNIFICANT CHANGE UP (ref 0.9–7.1)
CALCIUM SERPL-MCNC: 9.1 MG/DL — SIGNIFICANT CHANGE UP (ref 8.4–10.5)
CHLORIDE SERPL-SCNC: 104 MMOL/L — SIGNIFICANT CHANGE UP (ref 96–108)
CO2 SERPL-SCNC: 23 MMOL/L — SIGNIFICANT CHANGE UP (ref 22–31)
CREAT SERPL-MCNC: 0.62 MG/DL — SIGNIFICANT CHANGE UP (ref 0.5–1.3)
EOSINOPHIL # BLD AUTO: 4.9 K/UL — HIGH (ref 0–0.5)
EOSINOPHIL # BLD AUTO: 7.22 K/UL — HIGH (ref 0–0.5)
EOSINOPHIL NFR BLD AUTO: 6 % — SIGNIFICANT CHANGE UP (ref 0–6)
EOSINOPHIL NFR BLD AUTO: 8 % — HIGH (ref 0–6)
GIANT PLATELETS BLD QL SMEAR: PRESENT — SIGNIFICANT CHANGE UP
GLUCOSE SERPL-MCNC: 82 MG/DL — SIGNIFICANT CHANGE UP (ref 70–99)
HBA1C BLD-MCNC: 5.9 % — HIGH (ref 4–5.6)
HCT VFR BLD CALC: 32.6 % — LOW (ref 34.5–45)
HGB BLD-MCNC: 10.2 G/DL — LOW (ref 11.5–15.5)
LDH SERPL L TO P-CCNC: 778 U/L — HIGH (ref 50–242)
LYMPHOCYTES # BLD AUTO: 11 % — LOW (ref 13–44)
LYMPHOCYTES # BLD AUTO: 11 % — LOW (ref 13–44)
LYMPHOCYTES # BLD AUTO: 8.99 K/UL — HIGH (ref 1–3.3)
LYMPHOCYTES # BLD AUTO: 9.93 K/UL — HIGH (ref 1–3.3)
MAGNESIUM SERPL-MCNC: 2 MG/DL — SIGNIFICANT CHANGE UP (ref 1.6–2.6)
MANUAL DIF COMMENT BLD-IMP: SIGNIFICANT CHANGE UP
MANUAL SMEAR VERIFICATION: SIGNIFICANT CHANGE UP
MANUAL SMEAR VERIFICATION: SIGNIFICANT CHANGE UP
MCHC RBC-ENTMCNC: 26.5 PG — LOW (ref 27–34)
MCHC RBC-ENTMCNC: 31.3 GM/DL — LOW (ref 32–36)
MCV RBC AUTO: 84.7 FL — SIGNIFICANT CHANGE UP (ref 80–100)
METAMYELOCYTES # FLD: 3 % — HIGH (ref 0–0)
METAMYELOCYTES # FLD: 5 % — HIGH (ref 0–0)
MONOCYTES # BLD AUTO: 1.63 K/UL — HIGH (ref 0–0.9)
MONOCYTES # BLD AUTO: 2.71 K/UL — HIGH (ref 0–0.9)
MONOCYTES NFR BLD AUTO: 2 % — SIGNIFICANT CHANGE UP (ref 2–14)
MONOCYTES NFR BLD AUTO: 3 % — SIGNIFICANT CHANGE UP (ref 2–14)
MYELOCYTES NFR BLD: 6 % — HIGH (ref 0–0)
MYELOCYTES NFR BLD: 8 % — HIGH (ref 0–0)
NEUTROPHILS # BLD AUTO: 48.22 K/UL — HIGH (ref 1.8–7.4)
NEUTROPHILS # BLD AUTO: 49.64 K/UL — HIGH (ref 1.8–7.4)
NEUTROPHILS NFR BLD AUTO: 49 % — SIGNIFICANT CHANGE UP (ref 43–77)
NEUTROPHILS NFR BLD AUTO: 50 % — SIGNIFICANT CHANGE UP (ref 43–77)
NEUTS BAND # BLD: 10 % — HIGH (ref 0–8)
NEUTS BAND # BLD: 5 % — SIGNIFICANT CHANGE UP (ref 0–8)
NRBC # BLD: 1 /100 — HIGH (ref 0–0)
NRBC # BLD: 1 /100 — HIGH (ref 0–0)
PHOSPHATE SERPL-MCNC: 3.4 MG/DL — SIGNIFICANT CHANGE UP (ref 2.5–4.5)
PLAT MORPH BLD: ABNORMAL
PLAT MORPH BLD: NORMAL — SIGNIFICANT CHANGE UP
PLATELET # BLD AUTO: 637 K/UL — HIGH (ref 150–400)
POTASSIUM SERPL-MCNC: 3.8 MMOL/L — SIGNIFICANT CHANGE UP (ref 3.5–5.3)
POTASSIUM SERPL-SCNC: 3.8 MMOL/L — SIGNIFICANT CHANGE UP (ref 3.5–5.3)
PROMYELOCYTES # FLD: 1 % — HIGH (ref 0–0)
RBC # BLD: 3.85 M/UL — SIGNIFICANT CHANGE UP (ref 3.8–5.2)
RBC # FLD: 17.7 % — HIGH (ref 10.3–14.5)
RBC BLD AUTO: SIGNIFICANT CHANGE UP
RBC BLD AUTO: SIGNIFICANT CHANGE UP
SODIUM SERPL-SCNC: 141 MMOL/L — SIGNIFICANT CHANGE UP (ref 135–145)
TSH SERPL-MCNC: 4.86 UIU/ML — HIGH (ref 0.27–4.2)
URATE SERPL-MCNC: 4.6 MG/DL — SIGNIFICANT CHANGE UP (ref 2.5–7)
WBC # BLD: 81.73 K/UL — CRITICAL HIGH (ref 3.8–10.5)
WBC # FLD AUTO: 81.73 K/UL — CRITICAL HIGH (ref 3.8–10.5)

## 2017-10-02 PROCEDURE — 80053 COMPREHEN METABOLIC PANEL: CPT

## 2017-10-02 PROCEDURE — 74177 CT ABD & PELVIS W/CONTRAST: CPT

## 2017-10-02 PROCEDURE — 83735 ASSAY OF MAGNESIUM: CPT

## 2017-10-02 PROCEDURE — 83615 LACTATE (LD) (LDH) ENZYME: CPT

## 2017-10-02 PROCEDURE — 84295 ASSAY OF SERUM SODIUM: CPT

## 2017-10-02 PROCEDURE — 76536 US EXAM OF HEAD AND NECK: CPT | Mod: 26

## 2017-10-02 PROCEDURE — 82803 BLOOD GASES ANY COMBINATION: CPT

## 2017-10-02 PROCEDURE — 84443 ASSAY THYROID STIM HORMONE: CPT

## 2017-10-02 PROCEDURE — 82435 ASSAY OF BLOOD CHLORIDE: CPT

## 2017-10-02 PROCEDURE — 76536 US EXAM OF HEAD AND NECK: CPT

## 2017-10-02 PROCEDURE — 70491 CT SOFT TISSUE NECK W/DYE: CPT

## 2017-10-02 PROCEDURE — 88184 FLOWCYTOMETRY/ TC 1 MARKER: CPT

## 2017-10-02 PROCEDURE — G0452: CPT | Mod: 26

## 2017-10-02 PROCEDURE — 87086 URINE CULTURE/COLONY COUNT: CPT

## 2017-10-02 PROCEDURE — 85384 FIBRINOGEN ACTIVITY: CPT

## 2017-10-02 PROCEDURE — 84132 ASSAY OF SERUM POTASSIUM: CPT

## 2017-10-02 PROCEDURE — 81206 BCR/ABL1 GENE MAJOR BP: CPT

## 2017-10-02 PROCEDURE — 83036 HEMOGLOBIN GLYCOSYLATED A1C: CPT

## 2017-10-02 PROCEDURE — 80074 ACUTE HEPATITIS PANEL: CPT

## 2017-10-02 PROCEDURE — 84550 ASSAY OF BLOOD/URIC ACID: CPT

## 2017-10-02 PROCEDURE — 99239 HOSP IP/OBS DSCHRG MGMT >30: CPT

## 2017-10-02 PROCEDURE — 87389 HIV-1 AG W/HIV-1&-2 AB AG IA: CPT

## 2017-10-02 PROCEDURE — 88185 FLOWCYTOMETRY/TC ADD-ON: CPT

## 2017-10-02 PROCEDURE — 85014 HEMATOCRIT: CPT

## 2017-10-02 PROCEDURE — 83605 ASSAY OF LACTIC ACID: CPT

## 2017-10-02 PROCEDURE — 85730 THROMBOPLASTIN TIME PARTIAL: CPT

## 2017-10-02 PROCEDURE — 84100 ASSAY OF PHOSPHORUS: CPT

## 2017-10-02 PROCEDURE — 84681 ASSAY OF C-PEPTIDE: CPT

## 2017-10-02 PROCEDURE — 80048 BASIC METABOLIC PNL TOTAL CA: CPT

## 2017-10-02 PROCEDURE — 81270 JAK2 GENE: CPT

## 2017-10-02 PROCEDURE — 99285 EMERGENCY DEPT VISIT HI MDM: CPT | Mod: 25

## 2017-10-02 PROCEDURE — 85610 PROTHROMBIN TIME: CPT

## 2017-10-02 PROCEDURE — 82947 ASSAY GLUCOSE BLOOD QUANT: CPT

## 2017-10-02 PROCEDURE — 99233 SBSQ HOSP IP/OBS HIGH 50: CPT | Mod: GC

## 2017-10-02 PROCEDURE — 71260 CT THORAX DX C+: CPT

## 2017-10-02 PROCEDURE — 82330 ASSAY OF CALCIUM: CPT

## 2017-10-02 PROCEDURE — 81001 URINALYSIS AUTO W/SCOPE: CPT

## 2017-10-02 PROCEDURE — 85027 COMPLETE CBC AUTOMATED: CPT

## 2017-10-02 PROCEDURE — 87040 BLOOD CULTURE FOR BACTERIA: CPT

## 2017-10-02 RX ORDER — CAPTOPRIL 12.5 MG/1
1 TABLET ORAL
Qty: 0 | Refills: 0 | COMMUNITY

## 2017-10-02 RX ORDER — LISINOPRIL 2.5 MG/1
1 TABLET ORAL
Qty: 0 | Refills: 0 | COMMUNITY
Start: 2017-10-02

## 2017-10-02 RX ADMIN — SODIUM CHLORIDE 75 MILLILITER(S): 9 INJECTION INTRAMUSCULAR; INTRAVENOUS; SUBCUTANEOUS at 05:33

## 2017-10-02 RX ADMIN — SODIUM CHLORIDE 75 MILLILITER(S): 9 INJECTION INTRAMUSCULAR; INTRAVENOUS; SUBCUTANEOUS at 12:57

## 2017-10-02 RX ADMIN — LISINOPRIL 40 MILLIGRAM(S): 2.5 TABLET ORAL at 05:33

## 2017-10-02 NOTE — PROGRESS NOTE ADULT - ATTENDING COMMENTS
Agree with R1/R2 resident plan of care  57yoF PMH HTN p/w leukocytosis from clinic likely leukemia. CML)  - afebrile, c/o mild difficulty on mastication, CT neck showed right parotid gland tumor  - spoke to Dr Rae (IR), will do Bx today  - Heme will do BM biopsy today per conversation.  - d/c planning after the biopsy and outpatient f/u with Heme/Onc

## 2017-10-02 NOTE — PROGRESS NOTE ADULT - SUBJECTIVE AND OBJECTIVE BOX
Patient is a 57y old  Female who presents with a chief complaint of Leukocytosis (01 Oct 2017 23:04)        SUBJECTIVE / OVERNIGHT EVENTS: Pt reports doing well with no acute events o/n. Denies fever, chills, n/v, chest pain, abd pain. Report neck tenderness, relief with hot pack.     MEDICATIONS  (STANDING):  enoxaparin Injectable 40 milliGRAM(s) SubCutaneous every 24 hours  sodium chloride 0.9%. 1000 milliLiter(s) (75 mL/Hr) IV Continuous <Continuous>  lisinopril 40 milliGRAM(s) Oral daily    MEDICATIONS  (PRN):      T(C): 36.9 (10-02-17 @ 07:44), Max: 36.9 (10-02-17 @ 07:44)  HR: 69 (10-02-17 @ 07:44) (69 - 75)  BP: 142/85 (10-02-17 @ 07:44) (102/64 - 147/76)  RR: 18 (10-02-17 @ 07:44) (18 - 18)  SpO2: 97% (10-02-17 @ 07:44) (96% - 97%)  CAPILLARY BLOOD GLUCOSE  166 (01 Oct 2017 10:07)        I&O's Summary    01 Oct 2017 07:01  -  02 Oct 2017 07:00  --------------------------------------------------------  IN: 1140 mL / OUT: 0 mL / NET: 1140 mL        PHYSICAL EXAM  GENERAL: NAD, well-developed  HEAD:  Atraumatic, Normocephalic  EYES: EOMI, PERRLA, conjunctiva and sclera clear  NECK: Supple, No JVD  CHEST/LUNG: Clear to auscultation bilaterally; No wheeze  HEART: Regular rate and rhythm; No murmurs, rubs, or gallops  ABDOMEN: Soft, Nontender, Nondistended; Bowel sounds present  EXTREMITIES:  2+ Peripheral Pulses, No clubbing, cyanosis, or edema  PSYCH: AAOx3  SKIN: No rashes or lesions    LABS:                        10.9   90.26 )-----------( 670      ( 01 Oct 2017 09:05 )             33.1     10-    141  |  104  |  11  ----------------------------<  82  3.8   |  23  |  0.62    Ca    9.1      02 Oct 2017 07:42  Phos  3.4     10  Mg     2.0     10-02    TPro  7.6  /  Alb  4.2  /  TBili  0.2  /  DBili  x   /  AST  19  /  ALT  21  /  AlkPhos  72  10-01    PT/INR - ( 30 Sep 2017 11:40 )   PT: 12.6 sec;   INR: 1.15 ratio         PTT - ( 30 Sep 2017 11:40 )  PTT:25.6 sec      Urinalysis Basic - ( 30 Sep 2017 13:21 )    Color: x / Appearance: Clear / S.005 / pH: x  Gluc: x / Ketone: Negative  / Bili: Negative / Urobili: Negative   Blood: x / Protein: Negative / Nitrite: Negative   Leuk Esterase: Negative / RBC: 0-2 /HPF / WBC 0-2 /HPF   Sq Epi: x / Non Sq Epi: OCC /HPF / Bacteria: x        RADIOLOGY & ADDITIONAL TESTS:    Imaging Personally Reviewed:  Consultant(s) Notes Reviewed:    Care Discussed with Consultants/Other Providers: Patient is a 57y old  Female who presents with a chief complaint of Leukocytosis (01 Oct 2017 23:04)        SUBJECTIVE / OVERNIGHT EVENTS: Pt reports doing well with no acute events o/n. Denies fever, chills, n/v, chest pain, abd pain. Report neck tenderness, relief with hot pack.     MEDICATIONS  (STANDING):  enoxaparin Injectable 40 milliGRAM(s) SubCutaneous every 24 hours  sodium chloride 0.9%. 1000 milliLiter(s) (75 mL/Hr) IV Continuous <Continuous>  lisinopril 40 milliGRAM(s) Oral daily    MEDICATIONS  (PRN):      T(C): 36.9 (10-02-17 @ 07:44), Max: 36.9 (10-02-17 @ 07:44)  HR: 69 (10-02-17 @ 07:44) (69 - 75)  BP: 142/85 (10-02-17 @ 07:44) (102/64 - 147/76)  RR: 18 (10-02-17 @ 07:44) (18 - 18)  SpO2: 97% (10-02-17 @ 07:44) (96% - 97%)  CAPILLARY BLOOD GLUCOSE  166 (01 Oct 2017 10:07)        I&O's Summary    01 Oct 2017 07:01  -  02 Oct 2017 07:00  --------------------------------------------------------  IN: 1140 mL / OUT: 0 mL / NET: 1140 mL        PHYSICAL EXAM  GENERAL: NAD, well-developed  HEAD:  Atraumatic, Normocephalic  EYES: EOMI, PERRLA, conjunctiva and sclera clear  NECK: Supple, No JVD  CHEST/LUNG: Clear to auscultation bilaterally; No wheeze  HEART: Regular rate and rhythm; No murmurs, rubs, or gallops  ABDOMEN: Soft, Nontender, Nondistended; Bowel sounds present  EXTREMITIES:  2+ Peripheral Pulses, No clubbing, cyanosis, or edema  PSYCH: AAOx3  SKIN: No rashes or lesions    LABS:                        10.9   90.26 )-----------( 670      ( 01 Oct 2017 09:05 )             33.1     10-    141  |  104  |  11  ----------------------------<  82  3.8   |  23  |  0.62    Ca    9.1      02 Oct 2017 07:42  Phos  3.4     10  Mg     2.0     10-02    TPro  7.6  /  Alb  4.2  /  TBili  0.2  /  DBili  x   /  AST  19  /  ALT  21  /  AlkPhos  72  10-01    PT/INR - ( 30 Sep 2017 11:40 )   PT: 12.6 sec;   INR: 1.15 ratio         PTT - ( 30 Sep 2017 11:40 )  PTT:25.6 sec      Urinalysis Basic - ( 30 Sep 2017 13:21 )    Color: x / Appearance: Clear / S.005 / pH: x  Gluc: x / Ketone: Negative  / Bili: Negative / Urobili: Negative   Blood: x / Protein: Negative / Nitrite: Negative   Leuk Esterase: Negative / RBC: 0-2 /HPF / WBC 0-2 /HPF   Sq Epi: x / Non Sq Epi: OCC /HPF / Bacteria: x        RADIOLOGY & ADDITIONAL TESTS:  CT Abdomen and Pelvis w/ IV Cont (10.01.17 @ 09:21)  FINDINGS:  CHEST:   LUNGS AND LARGE AIRWAYS: Patent central airways. A 3 mm subpleural nodule   within the right upper lobe likely represents a subpleural lymph node.  PLEURA: No pleural effusion.  VESSELS: Within normal limits.  HEART: Heart size is normal. No pericardial effusion.  MEDIASTINUM AND JOSE: No lymphadenopathy.  CHEST WALL AND LOWER NECK: Within normal limits.  ABDOMEN AND PELVIS:  LIVER: Within normal limits.  BILE DUCTS: Normal caliber.  GALLBLADDER: Within normal limits.  SPLEEN: Enlarged, 14.0 cm in craniocaudal dimension.  PANCREAS: Within normal limits.  ADRENALS: Within normal limits.  KIDNEYS/URETERS: Within normal limits.  BLADDER: Within normal limits.  REPRODUCTIVE ORGANS: The uterus and adnexa are within normal limits.  BOWEL: No bowel obstruction. Appendix is normal.  PERITONEUM: No ascites.  VESSELS:  Within normal limits.  RETROPERITONEUM: An abnormal morphology hypervascular aortocaval node   (2:80) measuring 1.9 x 1.4 cm.    ABDOMINAL WALL: Within normal limits.  BONES: Degenerative changes.  IMPRESSION:   Abnormal aortocaval adenopathy  Splenomegaly.      < end of copied text >    Imaging Personally Reviewed:  Consultant(s) Notes Reviewed:    Care Discussed with Consultants/Other Providers: Patient is a 57y old  Female who presents with a chief complaint of Leukocytosis (01 Oct 2017 23:04)        SUBJECTIVE / OVERNIGHT EVENTS: Pt reports doing well with no acute events o/n. Denies fever, chills, n/v, chest pain, abd pain. Report neck tenderness, relief with hot pack.     MEDICATIONS  (STANDING):  enoxaparin Injectable 40 milliGRAM(s) SubCutaneous every 24 hours  sodium chloride 0.9%. 1000 milliLiter(s) (75 mL/Hr) IV Continuous <Continuous>  lisinopril 40 milliGRAM(s) Oral daily    MEDICATIONS  (PRN):      T(C): 36.9 (10-02-17 @ 07:44), Max: 36.9 (10-02-17 @ 07:44)  HR: 69 (10-02-17 @ 07:44) (69 - 75)  BP: 142/85 (10-02-17 @ 07:44) (102/64 - 147/76)  RR: 18 (10-02-17 @ 07:44) (18 - 18)  SpO2: 97% (10-02-17 @ 07:44) (96% - 97%)  CAPILLARY BLOOD GLUCOSE  166 (01 Oct 2017 10:07)        I&O's Summary    01 Oct 2017 07:01  -  02 Oct 2017 07:00  --------------------------------------------------------  IN: 1140 mL / OUT: 0 mL / NET: 1140 mL        PHYSICAL EXAM  GENERAL: NAD, well-developed  HEAD:  Atraumatic, Normocephalic  EYES: EOMI, PERRLA, conjunctiva and sclera clear  NECK: Supple, No JVD  CHEST/LUNG: Clear to auscultation bilaterally; No wheeze  HEART: Regular rate and rhythm; No murmurs, rubs, or gallops  ABDOMEN: Soft, Nontender, Nondistended; Bowel sounds present  EXTREMITIES:  2+ Peripheral Pulses, No clubbing, cyanosis, or edema  PSYCH: AAOx3  SKIN: No rashes or lesions    LABS:                        10.9   90.26 )-----------( 670      ( 01 Oct 2017 09:05 )             33.1     10-    141  |  104  |  11  ----------------------------<  82  3.8   |  23  |  0.62    Ca    9.1      02 Oct 2017 07:42  Phos  3.4     10  Mg     2.0     10-02    TPro  7.6  /  Alb  4.2  /  TBili  0.2  /  DBili  x   /  AST  19  /  ALT  21  /  AlkPhos  72  10-01    PT/INR - ( 30 Sep 2017 11:40 )   PT: 12.6 sec;   INR: 1.15 ratio         PTT - ( 30 Sep 2017 11:40 )  PTT:25.6 sec      Urinalysis Basic - ( 30 Sep 2017 13:21 )    Color: x / Appearance: Clear / S.005 / pH: x  Gluc: x / Ketone: Negative  / Bili: Negative / Urobili: Negative   Blood: x / Protein: Negative / Nitrite: Negative   Leuk Esterase: Negative / RBC: 0-2 /HPF / WBC 0-2 /HPF   Sq Epi: x / Non Sq Epi: OCC /HPF / Bacteria: x        RADIOLOGY & ADDITIONAL TESTS:  CT Neck Soft Tissue w/ IV Cont (10.01.17 @ 09:20) >  FINDINGS:  There is partialopacification of the bilateral maxillary sinuses,   compatible with mucosal polyps and/or retention cysts. The remaining   visualized paranasal sinuses are well developed and well aerated. There   are no nasal masses. The nasal septum is midline. Themastoids are well   pneumatized.  The nasopharynx, oropharynx, hypopharynx, and larynx are unremarkable.   Visualized trachea and esophagus are unremarkable.  There is a nonspecific 6 x 6 mm low-density lesion in the anterior right   parotid gland. The left parotid gland, bilateral submandibular glands,   and thyroid gland demonstrate no focal lesions.  There is no lymphadenopathy. There is no abnormal enhancement in the soft   tissues of the neck.  Visualized lung apices are clear.   IMPRESSION:  No abnormal enhancement or lymphadenopathy within the neck.  Nonspecific 6 x 6 mm low-density lesion in the anterior right parotid   gland. This may represent a sialocele or Warthin tumor.  Other salivary   gland tumors, while considered less likely, are not entirely excluded.        CT Abdomen and Pelvis w/ IV Cont (10.01.17 @ 09:21)  FINDINGS:  CHEST:   LUNGS AND LARGE AIRWAYS: Patent central airways. A 3 mm subpleural nodule   within the right upper lobe likely represents a subpleural lymph node.  PLEURA: No pleural effusion.  VESSELS: Within normal limits.  HEART: Heart size is normal. No pericardial effusion.  MEDIASTINUM AND JOSE: No lymphadenopathy.  CHEST WALL AND LOWER NECK: Within normal limits.  ABDOMEN AND PELVIS:  LIVER: Within normal limits.  BILE DUCTS: Normal caliber.  GALLBLADDER: Within normal limits.  SPLEEN: Enlarged, 14.0 cm in craniocaudal dimension.  PANCREAS: Within normal limits.  ADRENALS: Within normal limits.  KIDNEYS/URETERS: Within normal limits.  BLADDER: Within normal limits.  REPRODUCTIVE ORGANS: The uterus and adnexa are within normal limits.  BOWEL: No bowel obstruction. Appendix is normal.  PERITONEUM: No ascites.  VESSELS:  Within normal limits.  RETROPERITONEUM: An abnormal morphology hypervascular aortocaval node   (2:80) measuring 1.9 x 1.4 cm.    ABDOMINAL WALL: Within normal limits.  BONES: Degenerative changes.  IMPRESSION:   Abnormal aortocaval adenopathy  Splenomegaly.      < end of copied text >    Imaging Personally Reviewed:  Consultant(s) Notes Reviewed:    Care Discussed with Consultants/Other Providers:

## 2017-10-02 NOTE — PROGRESS NOTE ADULT - PROBLEM SELECTOR PLAN 1
Leukomoid reaction vs. leukemia (given increased promyelocytes, can be APL)  - low suspicion for blast crisis.  - unlikely TLS, although LDH elevated. Appreciate heme/onc recs: Will trend LDH, uric acid, CMP, phos daily. IVF, allopurinol 300mg daily.   - baseline TTE prior to cardiotoxic agent treatment.   - HIV neg  - Hep B neg  - CT Ch, A/P: 3 mm subpleural nodule, splenomegaly, Abnormal aortocaval adenopathy  - pending BM bx Leukomoid reaction vs. leukemia (given increased promyelocytes, can be APL)  - low suspicion for blast crisis.  - unlikely TLS, although LDH elevated. Appreciate heme/onc recs: Will trend LDH, uric acid, CMP, phos daily. IVF, allopurinol 300mg daily.   - baseline TTE prior to cardiotoxic agent treatment.   - HIV neg  - Hep B neg  - CT Neck: Nonspecific 6 x 6 mm low-density lesion in the anterior right parotid   gland  - CT Ch, A/P: 3 mm subpleural nodule, splenomegaly, Abnormal aortocaval adenopathy  - pending BM bx by Heme/Onc  - pending parotid bx by ENT

## 2017-10-02 NOTE — PROGRESS NOTE ADULT - SUBJECTIVE AND OBJECTIVE BOX
INTERVAL HPI/OVERNIGHT EVENTS:  Patient S&E at bedside. No o/n events, patient resting comfortably. No complaints at this time. Patient denies fever, chills, dizziness, weakness, CP, palpitations, SOB, cough, N/V/D/C, dysuria, changes in bowel movements, LE edema.    VITAL SIGNS:  T(F): 98.8 (10-02-17 @ 16:14)  HR: 71 (10-02-17 @ 16:14)  BP: 134/76 (10-02-17 @ 16:14)  RR: 18 (10-02-17 @ 16:14)  SpO2: 98% (10-02-17 @ 16:14)  Wt(kg): --    PHYSICAL EXAM:    Constitutional: NAD  Eyes: EOMI, sclera non-icteric  Neck: supple, no masses, no JVD  Respiratory: CTA b/l, good air entry b/l  Cardiovascular: RRR, no M/R/G  Gastrointestinal: soft, NTND, no masses palpable, + BS, no hepatosplenomegaly  Extremities: no c/c/e  Neurological: AAOx3      MEDICATIONS  (STANDING):  enoxaparin Injectable 40 milliGRAM(s) SubCutaneous every 24 hours  sodium chloride 0.9%. 1000 milliLiter(s) (75 mL/Hr) IV Continuous <Continuous>  lisinopril 40 milliGRAM(s) Oral daily    MEDICATIONS  (PRN):      Allergies    No Known Allergies    Intolerances        LABS:                        10.2   81.73 )-----------( 637      ( 02 Oct 2017 07:36 )             32.6     10-02    141  |  104  |  11  ----------------------------<  82  3.8   |  23  |  0.62    Ca    9.1      02 Oct 2017 07:42  Phos  3.4     10-02  Mg     2.0     10-02    TPro  7.6  /  Alb  4.2  /  TBili  0.2  /  DBili  x   /  AST  19  /  ALT  21  /  AlkPhos  72  10-01          RADIOLOGY & ADDITIONAL TESTS:  Studies reviewed.    ASSESSMENT & PLAN:

## 2017-10-02 NOTE — PROGRESS NOTE ADULT - PROBLEM SELECTOR PLAN 1
She remains asymptomatic with counts and clinical picture consistent with CML. With , we reviewed bone marrow biopsy and next steps for further evaluation of the leukocytosis and the potential treatment. She is agreeable to evaluation. No significant adenopathy on CT and normal uric acid. Will discontinue allopurinol. - continues to remain asymptomatic. WBC 81k today. Discussed PBS findings with hematopathology, consistent with CML, 2-3% blasts. Clinical picture also c/w CML.   - she will need to complete workup (including BMBx), which can be done as outpatient given she is medically cleared for discharge.   Will f/u BCR-ABL. Discussed diagnosis and next steps with patient, and she is agreeable. Will need to followup at [facility] on discharge - [phone].

## 2017-10-03 LAB
BCR/ABL BY RT - PCR QUANTITATIVE: SIGNIFICANT CHANGE UP
JAK2 P.V617F BLD/T QL: SIGNIFICANT CHANGE UP
TM INTERPRETATION: SIGNIFICANT CHANGE UP

## 2017-10-05 LAB
CULTURE RESULTS: SIGNIFICANT CHANGE UP
CULTURE RESULTS: SIGNIFICANT CHANGE UP
SPECIMEN SOURCE: SIGNIFICANT CHANGE UP
SPECIMEN SOURCE: SIGNIFICANT CHANGE UP

## 2017-10-06 DIAGNOSIS — Z78.9 OTHER SPECIFIED HEALTH STATUS: ICD-10-CM

## 2017-10-06 DIAGNOSIS — Z23 ENCOUNTER FOR IMMUNIZATION: ICD-10-CM

## 2017-10-06 PROBLEM — I10 ESSENTIAL (PRIMARY) HYPERTENSION: Chronic | Status: ACTIVE | Noted: 2017-09-30

## 2017-10-11 ENCOUNTER — OUTPATIENT (OUTPATIENT)
Dept: OUTPATIENT SERVICES | Facility: HOSPITAL | Age: 57
LOS: 1 days | Discharge: HOME | End: 2017-10-11

## 2017-10-11 DIAGNOSIS — Z98.891 HISTORY OF UTERINE SCAR FROM PREVIOUS SURGERY: Chronic | ICD-10-CM

## 2017-10-11 DIAGNOSIS — C92.10 CHRONIC MYELOID LEUKEMIA, BCR/ABL-POSITIVE, NOT HAVING ACHIEVED REMISSION: ICD-10-CM

## 2017-10-16 ENCOUNTER — RESULT REVIEW (OUTPATIENT)
Age: 57
End: 2017-10-16

## 2017-10-16 ENCOUNTER — OTHER (OUTPATIENT)
Age: 57
End: 2017-10-16

## 2017-10-16 ENCOUNTER — APPOINTMENT (OUTPATIENT)
Dept: HEMATOLOGY ONCOLOGY | Facility: CLINIC | Age: 57
End: 2017-10-16

## 2017-10-16 ENCOUNTER — OUTPATIENT (OUTPATIENT)
Dept: OUTPATIENT SERVICES | Facility: HOSPITAL | Age: 57
LOS: 1 days | End: 2017-10-16
Payer: MEDICAID

## 2017-10-16 VITALS
HEART RATE: 70 BPM | TEMPERATURE: 97.9 F | RESPIRATION RATE: 16 BRPM | OXYGEN SATURATION: 100 % | DIASTOLIC BLOOD PRESSURE: 83 MMHG | BODY MASS INDEX: 38.99 KG/M2 | WEIGHT: 167.77 LBS | SYSTOLIC BLOOD PRESSURE: 141 MMHG

## 2017-10-16 DIAGNOSIS — Z98.891 HISTORY OF UTERINE SCAR FROM PREVIOUS SURGERY: Chronic | ICD-10-CM

## 2017-10-16 DIAGNOSIS — C92.10 CHRONIC MYELOID LEUKEMIA, BCR/ABL-POSITIVE, NOT HAVING ACHIEVED REMISSION: ICD-10-CM

## 2017-10-16 LAB
ALBUMIN SERPL ELPH-MCNC: 4.6 G/DL — SIGNIFICANT CHANGE UP (ref 3.3–5)
ALP SERPL-CCNC: 76 U/L — SIGNIFICANT CHANGE UP (ref 40–120)
ALT FLD-CCNC: 20 U/L — SIGNIFICANT CHANGE UP (ref 10–45)
ANION GAP SERPL CALC-SCNC: 15 MMOL/L — SIGNIFICANT CHANGE UP (ref 5–17)
AST SERPL-CCNC: 24 U/L — SIGNIFICANT CHANGE UP (ref 10–40)
BASOPHILS NFR BLD AUTO: 3 % — HIGH (ref 0–2)
BILIRUB SERPL-MCNC: 0.3 MG/DL — SIGNIFICANT CHANGE UP (ref 0.2–1.2)
BLASTS # FLD: 1 % — HIGH (ref 0–0)
BUN SERPL-MCNC: 10 MG/DL — SIGNIFICANT CHANGE UP (ref 7–23)
CALCIUM SERPL-MCNC: 10.1 MG/DL — SIGNIFICANT CHANGE UP (ref 8.4–10.5)
CHLORIDE SERPL-SCNC: 101 MMOL/L — SIGNIFICANT CHANGE UP (ref 96–108)
CO2 SERPL-SCNC: 26 MMOL/L — SIGNIFICANT CHANGE UP (ref 22–31)
CREAT SERPL-MCNC: 0.72 MG/DL — SIGNIFICANT CHANGE UP (ref 0.5–1.3)
EOSINOPHIL NFR BLD AUTO: 3 % — SIGNIFICANT CHANGE UP (ref 0–6)
GLUCOSE SERPL-MCNC: 88 MG/DL — SIGNIFICANT CHANGE UP (ref 70–99)
LYMPHOCYTES # BLD AUTO: 5 % — LOW (ref 13–44)
LYMPHOCYTES # BLD AUTO: SIGNIFICANT CHANGE UP (ref 1–3.3)
METAMYELOCYTES # FLD: 4 % — HIGH (ref 0–0)
MONOCYTES NFR BLD AUTO: 1 % — LOW (ref 2–14)
MYELOCYTES NFR BLD: 12 % — HIGH (ref 0–0)
NEUTROPHILS # BLD AUTO: SIGNIFICANT CHANGE UP (ref 1.8–7.4)
NEUTROPHILS NFR BLD AUTO: 65 % — SIGNIFICANT CHANGE UP (ref 43–77)
NEUTS BAND # BLD: 4 % — SIGNIFICANT CHANGE UP (ref 0–8)
NRBC # BLD: 1 /100 — HIGH (ref 0–0)
PLAT MORPH BLD: NORMAL — SIGNIFICANT CHANGE UP
POTASSIUM SERPL-MCNC: 5 MMOL/L — SIGNIFICANT CHANGE UP (ref 3.5–5.3)
POTASSIUM SERPL-SCNC: 5 MMOL/L — SIGNIFICANT CHANGE UP (ref 3.5–5.3)
PROMYELOCYTES # FLD: 2 % — HIGH (ref 0–0)
PROT SERPL-MCNC: 8.1 G/DL — SIGNIFICANT CHANGE UP (ref 6–8.3)
RBC BLD AUTO: SIGNIFICANT CHANGE UP
SODIUM SERPL-SCNC: 142 MMOL/L — SIGNIFICANT CHANGE UP (ref 135–145)

## 2017-10-16 PROCEDURE — 88280 CHROMOSOME KARYOTYPE STUDY: CPT

## 2017-10-16 PROCEDURE — 81206 BCR/ABL1 GENE MAJOR BP: CPT

## 2017-10-16 PROCEDURE — 88184 FLOWCYTOMETRY/ TC 1 MARKER: CPT

## 2017-10-16 PROCEDURE — G0452: CPT | Mod: 26

## 2017-10-16 PROCEDURE — 88313 SPECIAL STAINS GROUP 2: CPT | Mod: 26

## 2017-10-16 PROCEDURE — 88305 TISSUE EXAM BY PATHOLOGIST: CPT

## 2017-10-16 PROCEDURE — 88291 CYTO/MOLECULAR REPORT: CPT | Mod: 59

## 2017-10-16 PROCEDURE — 85097 BONE MARROW INTERPRETATION: CPT

## 2017-10-16 PROCEDURE — 88305 TISSUE EXAM BY PATHOLOGIST: CPT | Mod: 26

## 2017-10-16 PROCEDURE — 88237 TISSUE CULTURE BONE MARROW: CPT

## 2017-10-16 PROCEDURE — 88313 SPECIAL STAINS GROUP 2: CPT

## 2017-10-16 PROCEDURE — 88264 CHROMOSOME ANALYSIS 20-25: CPT

## 2017-10-16 PROCEDURE — 88185 FLOWCYTOMETRY/TC ADD-ON: CPT

## 2017-10-16 PROCEDURE — 88275 CYTOGENETICS 100-300: CPT

## 2017-10-16 PROCEDURE — 88271 CYTOGENETICS DNA PROBE: CPT

## 2017-10-16 PROCEDURE — 88189 FLOWCYTOMETRY/READ 16 & >: CPT

## 2017-10-16 RX ORDER — IMATINIB MESYLATE 400 MG/1
400 TABLET, FILM COATED ORAL DAILY
Qty: 30 | Refills: 3 | Status: DISCONTINUED | COMMUNITY
Start: 2017-10-16 | End: 2017-10-16

## 2017-10-17 ENCOUNTER — RESULT REVIEW (OUTPATIENT)
Age: 57
End: 2017-10-17

## 2017-10-18 ENCOUNTER — OUTPATIENT (OUTPATIENT)
Dept: OUTPATIENT SERVICES | Facility: HOSPITAL | Age: 57
LOS: 1 days | End: 2017-10-18
Payer: SELF-PAY

## 2017-10-18 ENCOUNTER — LABORATORY RESULT (OUTPATIENT)
Age: 57
End: 2017-10-18

## 2017-10-18 ENCOUNTER — APPOINTMENT (OUTPATIENT)
Dept: OBGYN | Facility: CLINIC | Age: 57
End: 2017-10-18
Payer: SELF-PAY

## 2017-10-18 VITALS
BODY MASS INDEX: 39.57 KG/M2 | SYSTOLIC BLOOD PRESSURE: 140 MMHG | HEIGHT: 55 IN | DIASTOLIC BLOOD PRESSURE: 80 MMHG | WEIGHT: 171 LBS

## 2017-10-18 DIAGNOSIS — Z98.891 HISTORY OF UTERINE SCAR FROM PREVIOUS SURGERY: Chronic | ICD-10-CM

## 2017-10-18 DIAGNOSIS — Z01.419 ENCOUNTER FOR GYNECOLOGICAL EXAMINATION (GENERAL) (ROUTINE) WITHOUT ABNORMAL FINDINGS: ICD-10-CM

## 2017-10-18 DIAGNOSIS — Z01.419 ENCOUNTER FOR GYNECOLOGICAL EXAMINATION (GENERAL) (ROUTINE) W/OUT ABNORMAL FINDINGS: ICD-10-CM

## 2017-10-18 DIAGNOSIS — N76.0 ACUTE VAGINITIS: ICD-10-CM

## 2017-10-18 PROCEDURE — 87624 HPV HI-RISK TYP POOLED RSLT: CPT

## 2017-10-18 PROCEDURE — 99386 PREV VISIT NEW AGE 40-64: CPT | Mod: GC,NC

## 2017-10-19 LAB
BCR/ABL BY RT - PCR QUANTITATIVE: SIGNIFICANT CHANGE UP
C TRACH RRNA SPEC QL NAA+PROBE: SIGNIFICANT CHANGE UP
CHROM ANALY INTERPHASE BLD FISH-IMP: SIGNIFICANT CHANGE UP
HEMATOPATHOLOGY REPORT: SIGNIFICANT CHANGE UP
HPV HIGH+LOW RISK DNA PNL CVX: SIGNIFICANT CHANGE UP
N GONORRHOEA RRNA SPEC QL NAA+PROBE: SIGNIFICANT CHANGE UP
SPECIMEN SOURCE: SIGNIFICANT CHANGE UP

## 2017-10-30 LAB — CHROM ANALY OVERALL INTERP SPEC-IMP: SIGNIFICANT CHANGE UP

## 2017-11-14 ENCOUNTER — RESULT REVIEW (OUTPATIENT)
Age: 57
End: 2017-11-14

## 2017-11-28 ENCOUNTER — OUTPATIENT (OUTPATIENT)
Dept: OUTPATIENT SERVICES | Facility: HOSPITAL | Age: 57
LOS: 1 days | Discharge: HOME | End: 2017-11-28

## 2017-11-28 DIAGNOSIS — Z98.891 HISTORY OF UTERINE SCAR FROM PREVIOUS SURGERY: Chronic | ICD-10-CM

## 2017-11-28 DIAGNOSIS — C92.10 CHRONIC MYELOID LEUKEMIA, BCR/ABL-POSITIVE, NOT HAVING ACHIEVED REMISSION: ICD-10-CM

## 2017-12-04 ENCOUNTER — RESULT REVIEW (OUTPATIENT)
Age: 57
End: 2017-12-04

## 2017-12-04 ENCOUNTER — APPOINTMENT (OUTPATIENT)
Dept: HEMATOLOGY ONCOLOGY | Facility: CLINIC | Age: 57
End: 2017-12-04

## 2017-12-04 VITALS
SYSTOLIC BLOOD PRESSURE: 114 MMHG | DIASTOLIC BLOOD PRESSURE: 71 MMHG | OXYGEN SATURATION: 100 % | TEMPERATURE: 99 F | RESPIRATION RATE: 16 BRPM | WEIGHT: 170.86 LBS | BODY MASS INDEX: 39.71 KG/M2 | HEART RATE: 70 BPM

## 2017-12-04 LAB
BASOPHILS # BLD AUTO: 0 K/UL — SIGNIFICANT CHANGE UP (ref 0–0.2)
BASOPHILS NFR BLD AUTO: 1 % — SIGNIFICANT CHANGE UP (ref 0–2)
EOSINOPHIL # BLD AUTO: 0.4 K/UL — SIGNIFICANT CHANGE UP (ref 0–0.5)
EOSINOPHIL NFR BLD AUTO: 11.3 % — HIGH (ref 0–6)
LYMPHOCYTES # BLD AUTO: 1.6 K/UL — SIGNIFICANT CHANGE UP (ref 1–3.3)
LYMPHOCYTES # BLD AUTO: 43.4 % — SIGNIFICANT CHANGE UP (ref 13–44)
MONOCYTES # BLD AUTO: 0.2 K/UL — SIGNIFICANT CHANGE UP (ref 0–0.9)
MONOCYTES NFR BLD AUTO: 6.8 % — SIGNIFICANT CHANGE UP (ref 2–14)
NEUTROPHILS # BLD AUTO: 1.4 K/UL — LOW (ref 1.8–7.4)
NEUTROPHILS NFR BLD AUTO: 37.4 % — LOW (ref 43–77)

## 2017-12-07 ENCOUNTER — RESULT REVIEW (OUTPATIENT)
Age: 57
End: 2017-12-07

## 2017-12-11 ENCOUNTER — RESULT REVIEW (OUTPATIENT)
Age: 57
End: 2017-12-11

## 2017-12-11 ENCOUNTER — APPOINTMENT (OUTPATIENT)
Dept: HEMATOLOGY ONCOLOGY | Facility: CLINIC | Age: 57
End: 2017-12-11

## 2017-12-11 LAB
BASOPHILS # BLD AUTO: 0.1 K/UL — SIGNIFICANT CHANGE UP (ref 0–0.2)
BASOPHILS NFR BLD AUTO: 1.7 % — SIGNIFICANT CHANGE UP (ref 0–2)
EOSINOPHIL # BLD AUTO: 0.4 K/UL — SIGNIFICANT CHANGE UP (ref 0–0.5)
EOSINOPHIL NFR BLD AUTO: 8 % — HIGH (ref 0–6)
HCT VFR BLD CALC: 32.5 % — LOW (ref 34.5–45)
HGB BLD-MCNC: 11.1 G/DL — LOW (ref 11.5–15.5)
LYMPHOCYTES # BLD AUTO: 1.9 K/UL — SIGNIFICANT CHANGE UP (ref 1–3.3)
LYMPHOCYTES # BLD AUTO: 40.6 % — SIGNIFICANT CHANGE UP (ref 13–44)
MCHC RBC-ENTMCNC: 28.1 PG — SIGNIFICANT CHANGE UP (ref 27–34)
MCHC RBC-ENTMCNC: 34.2 G/DL — SIGNIFICANT CHANGE UP (ref 32–36)
MCV RBC AUTO: 82.1 FL — SIGNIFICANT CHANGE UP (ref 80–100)
MONOCYTES # BLD AUTO: 0.4 K/UL — SIGNIFICANT CHANGE UP (ref 0–0.9)
MONOCYTES NFR BLD AUTO: 8.1 % — SIGNIFICANT CHANGE UP (ref 2–14)
NEUTROPHILS # BLD AUTO: 1.9 K/UL — SIGNIFICANT CHANGE UP (ref 1.8–7.4)
NEUTROPHILS NFR BLD AUTO: 41.6 % — LOW (ref 43–77)
PLATELET # BLD AUTO: 218 K/UL — SIGNIFICANT CHANGE UP (ref 150–400)
RBC # BLD: 3.95 M/UL — SIGNIFICANT CHANGE UP (ref 3.8–5.2)
RBC # FLD: 14.4 % — SIGNIFICANT CHANGE UP (ref 10.3–14.5)
WBC # BLD: 4.6 K/UL — SIGNIFICANT CHANGE UP (ref 3.8–10.5)
WBC # FLD AUTO: 4.6 K/UL — SIGNIFICANT CHANGE UP (ref 3.8–10.5)

## 2018-01-19 ENCOUNTER — OUTPATIENT (OUTPATIENT)
Dept: OUTPATIENT SERVICES | Facility: HOSPITAL | Age: 58
LOS: 1 days | Discharge: HOME | End: 2018-01-19

## 2018-01-19 DIAGNOSIS — Z98.891 HISTORY OF UTERINE SCAR FROM PREVIOUS SURGERY: Chronic | ICD-10-CM

## 2018-01-19 DIAGNOSIS — C92.10 CHRONIC MYELOID LEUKEMIA, BCR/ABL-POSITIVE, NOT HAVING ACHIEVED REMISSION: ICD-10-CM

## 2018-01-22 ENCOUNTER — RESULT REVIEW (OUTPATIENT)
Age: 58
End: 2018-01-22

## 2018-01-22 ENCOUNTER — APPOINTMENT (OUTPATIENT)
Dept: HEMATOLOGY ONCOLOGY | Facility: CLINIC | Age: 58
End: 2018-01-22

## 2018-01-22 ENCOUNTER — OUTPATIENT (OUTPATIENT)
Dept: OUTPATIENT SERVICES | Facility: HOSPITAL | Age: 58
LOS: 1 days | End: 2018-01-22
Payer: MEDICAID

## 2018-01-22 VITALS
RESPIRATION RATE: 16 BRPM | DIASTOLIC BLOOD PRESSURE: 70 MMHG | HEART RATE: 68 BPM | TEMPERATURE: 98 F | OXYGEN SATURATION: 99 % | BODY MASS INDEX: 39.2 KG/M2 | SYSTOLIC BLOOD PRESSURE: 130 MMHG | WEIGHT: 168.65 LBS

## 2018-01-22 DIAGNOSIS — C92.10 CHRONIC MYELOID LEUKEMIA, BCR/ABL-POSITIVE, NOT HAVING ACHIEVED REMISSION: ICD-10-CM

## 2018-01-22 DIAGNOSIS — Z98.891 HISTORY OF UTERINE SCAR FROM PREVIOUS SURGERY: Chronic | ICD-10-CM

## 2018-01-22 LAB
ALBUMIN SERPL ELPH-MCNC: 4.2 G/DL — SIGNIFICANT CHANGE UP (ref 3.3–5)
ALP SERPL-CCNC: 153 U/L — HIGH (ref 40–120)
ALT FLD-CCNC: 22 U/L — SIGNIFICANT CHANGE UP (ref 10–45)
ANION GAP SERPL CALC-SCNC: 14 MMOL/L — SIGNIFICANT CHANGE UP (ref 5–17)
AST SERPL-CCNC: 24 U/L — SIGNIFICANT CHANGE UP (ref 10–40)
BILIRUB SERPL-MCNC: 0.3 MG/DL — SIGNIFICANT CHANGE UP (ref 0.2–1.2)
BUN SERPL-MCNC: 16 MG/DL — SIGNIFICANT CHANGE UP (ref 7–23)
CALCIUM SERPL-MCNC: 8.9 MG/DL — SIGNIFICANT CHANGE UP (ref 8.4–10.5)
CHLORIDE SERPL-SCNC: 103 MMOL/L — SIGNIFICANT CHANGE UP (ref 96–108)
CO2 SERPL-SCNC: 23 MMOL/L — SIGNIFICANT CHANGE UP (ref 22–31)
CREAT SERPL-MCNC: 0.7 MG/DL — SIGNIFICANT CHANGE UP (ref 0.5–1.3)
GLUCOSE SERPL-MCNC: 89 MG/DL — SIGNIFICANT CHANGE UP (ref 70–99)
POTASSIUM SERPL-MCNC: 4.2 MMOL/L — SIGNIFICANT CHANGE UP (ref 3.5–5.3)
POTASSIUM SERPL-SCNC: 4.2 MMOL/L — SIGNIFICANT CHANGE UP (ref 3.5–5.3)
PROT SERPL-MCNC: 7 G/DL — SIGNIFICANT CHANGE UP (ref 6–8.3)
SODIUM SERPL-SCNC: 140 MMOL/L — SIGNIFICANT CHANGE UP (ref 135–145)

## 2018-01-22 PROCEDURE — 81206 BCR/ABL1 GENE MAJOR BP: CPT

## 2018-01-22 PROCEDURE — G0452: CPT | Mod: 26

## 2018-01-22 RX ORDER — ACETAMINOPHEN 325 MG/1
325 TABLET, FILM COATED ORAL EVERY 8 HOURS
Qty: 30 | Refills: 0 | Status: DISCONTINUED | COMMUNITY
Start: 2017-10-16 | End: 2018-01-22

## 2018-02-02 RX ORDER — LANOLIN ALCOHOL/MO/W.PET/CERES
CREAM (GRAM) TOPICAL
Qty: 1 | Refills: 0 | Status: ACTIVE | COMMUNITY
Start: 2018-01-22 | End: 1900-01-01

## 2018-02-13 ENCOUNTER — OTHER (OUTPATIENT)
Age: 58
End: 2018-02-13

## 2018-02-20 ENCOUNTER — OUTPATIENT (OUTPATIENT)
Dept: OUTPATIENT SERVICES | Facility: HOSPITAL | Age: 58
LOS: 1 days | Discharge: HOME | End: 2018-02-20

## 2018-02-20 DIAGNOSIS — C92.10 CHRONIC MYELOID LEUKEMIA, BCR/ABL-POSITIVE, NOT HAVING ACHIEVED REMISSION: ICD-10-CM

## 2018-02-20 DIAGNOSIS — Z98.891 HISTORY OF UTERINE SCAR FROM PREVIOUS SURGERY: Chronic | ICD-10-CM

## 2018-02-26 ENCOUNTER — RESULT REVIEW (OUTPATIENT)
Age: 58
End: 2018-02-26

## 2018-02-26 ENCOUNTER — APPOINTMENT (OUTPATIENT)
Dept: HEMATOLOGY ONCOLOGY | Facility: CLINIC | Age: 58
End: 2018-02-26

## 2018-02-26 VITALS
RESPIRATION RATE: 16 BRPM | OXYGEN SATURATION: 99 % | SYSTOLIC BLOOD PRESSURE: 130 MMHG | HEART RATE: 72 BPM | BODY MASS INDEX: 38.94 KG/M2 | WEIGHT: 167.55 LBS | DIASTOLIC BLOOD PRESSURE: 70 MMHG | TEMPERATURE: 97 F

## 2018-02-26 LAB
ALBUMIN SERPL ELPH-MCNC: 4.5 G/DL — SIGNIFICANT CHANGE UP (ref 3.3–5)
ALP SERPL-CCNC: 161 U/L — HIGH (ref 40–120)
ALT FLD-CCNC: 33 U/L — SIGNIFICANT CHANGE UP (ref 10–45)
ANION GAP SERPL CALC-SCNC: 17 MMOL/L — SIGNIFICANT CHANGE UP (ref 5–17)
AST SERPL-CCNC: 31 U/L — SIGNIFICANT CHANGE UP (ref 10–40)
BASOPHILS # BLD AUTO: 0 K/UL — SIGNIFICANT CHANGE UP (ref 0–0.2)
BILIRUB SERPL-MCNC: 0.2 MG/DL — SIGNIFICANT CHANGE UP (ref 0.2–1.2)
BUN SERPL-MCNC: 14 MG/DL — SIGNIFICANT CHANGE UP (ref 7–23)
CALCIUM SERPL-MCNC: 9.1 MG/DL — SIGNIFICANT CHANGE UP (ref 8.4–10.5)
CHLORIDE SERPL-SCNC: 104 MMOL/L — SIGNIFICANT CHANGE UP (ref 96–108)
CO2 SERPL-SCNC: 23 MMOL/L — SIGNIFICANT CHANGE UP (ref 22–31)
CREAT SERPL-MCNC: 0.71 MG/DL — SIGNIFICANT CHANGE UP (ref 0.5–1.3)
EOSINOPHIL # BLD AUTO: 1.9 K/UL — HIGH (ref 0–0.5)
EOSINOPHIL NFR BLD AUTO: 19 % — HIGH (ref 0–6)
GLUCOSE SERPL-MCNC: 83 MG/DL — SIGNIFICANT CHANGE UP (ref 70–99)
LYMPHOCYTES # BLD AUTO: 1.6 K/UL — SIGNIFICANT CHANGE UP (ref 1–3.3)
LYMPHOCYTES # BLD AUTO: 27 % — SIGNIFICANT CHANGE UP (ref 13–44)
MONOCYTES # BLD AUTO: 0.7 K/UL — SIGNIFICANT CHANGE UP (ref 0–0.9)
MONOCYTES NFR BLD AUTO: 1 % — LOW (ref 2–14)
NEUTROPHILS # BLD AUTO: 4.5 K/UL — SIGNIFICANT CHANGE UP (ref 1.8–7.4)
NEUTROPHILS NFR BLD AUTO: 53 % — SIGNIFICANT CHANGE UP (ref 43–77)
PLAT MORPH BLD: NORMAL — SIGNIFICANT CHANGE UP
POTASSIUM SERPL-MCNC: 4.8 MMOL/L — SIGNIFICANT CHANGE UP (ref 3.5–5.3)
POTASSIUM SERPL-SCNC: 4.8 MMOL/L — SIGNIFICANT CHANGE UP (ref 3.5–5.3)
PROT SERPL-MCNC: 7.5 G/DL — SIGNIFICANT CHANGE UP (ref 6–8.3)
RBC BLD AUTO: SIGNIFICANT CHANGE UP
SODIUM SERPL-SCNC: 144 MMOL/L — SIGNIFICANT CHANGE UP (ref 135–145)

## 2018-03-16 ENCOUNTER — APPOINTMENT (OUTPATIENT)
Dept: DERMATOLOGY | Facility: CLINIC | Age: 58
End: 2018-03-16
Payer: SELF-PAY

## 2018-03-16 ENCOUNTER — MOBILE ON CALL (OUTPATIENT)
Age: 58
End: 2018-03-16

## 2018-03-16 VITALS
WEIGHT: 168 LBS | SYSTOLIC BLOOD PRESSURE: 130 MMHG | HEIGHT: 55 IN | BODY MASS INDEX: 38.88 KG/M2 | DIASTOLIC BLOOD PRESSURE: 80 MMHG

## 2018-03-16 DIAGNOSIS — Z91.89 OTHER SPECIFIED PERSONAL RISK FACTORS, NOT ELSEWHERE CLASSIFIED: ICD-10-CM

## 2018-03-16 PROCEDURE — 99243 OFF/OP CNSLTJ NEW/EST LOW 30: CPT

## 2018-03-16 RX ORDER — TRIAMCINOLONE ACETONIDE 1 MG/G
0.1 CREAM TOPICAL TWICE DAILY
Qty: 1 | Refills: 1 | Status: DISCONTINUED | COMMUNITY
Start: 2018-03-16 | End: 2018-03-16

## 2018-03-30 ENCOUNTER — APPOINTMENT (OUTPATIENT)
Dept: DERMATOLOGY | Facility: CLINIC | Age: 58
End: 2018-03-30
Payer: MEDICAID

## 2018-03-30 VITALS — DIASTOLIC BLOOD PRESSURE: 80 MMHG | SYSTOLIC BLOOD PRESSURE: 124 MMHG

## 2018-03-30 PROCEDURE — 99213 OFFICE O/P EST LOW 20 MIN: CPT

## 2018-04-17 ENCOUNTER — OUTPATIENT (OUTPATIENT)
Dept: OUTPATIENT SERVICES | Facility: HOSPITAL | Age: 58
LOS: 1 days | Discharge: HOME | End: 2018-04-17

## 2018-04-17 DIAGNOSIS — Z98.891 HISTORY OF UTERINE SCAR FROM PREVIOUS SURGERY: Chronic | ICD-10-CM

## 2018-04-17 DIAGNOSIS — C92.10 CHRONIC MYELOID LEUKEMIA, BCR/ABL-POSITIVE, NOT HAVING ACHIEVED REMISSION: ICD-10-CM

## 2018-04-20 ENCOUNTER — OUTPATIENT (OUTPATIENT)
Dept: OUTPATIENT SERVICES | Facility: HOSPITAL | Age: 58
LOS: 1 days | End: 2018-04-20
Payer: MEDICAID

## 2018-04-20 DIAGNOSIS — C92.10 CHRONIC MYELOID LEUKEMIA, BCR/ABL-POSITIVE, NOT HAVING ACHIEVED REMISSION: ICD-10-CM

## 2018-04-20 DIAGNOSIS — Z98.891 HISTORY OF UTERINE SCAR FROM PREVIOUS SURGERY: Chronic | ICD-10-CM

## 2018-04-23 ENCOUNTER — RESULT REVIEW (OUTPATIENT)
Age: 58
End: 2018-04-23

## 2018-04-23 ENCOUNTER — APPOINTMENT (OUTPATIENT)
Dept: HEMATOLOGY ONCOLOGY | Facility: CLINIC | Age: 58
End: 2018-04-23

## 2018-04-23 VITALS
BODY MASS INDEX: 39.45 KG/M2 | RESPIRATION RATE: 16 BRPM | DIASTOLIC BLOOD PRESSURE: 67 MMHG | WEIGHT: 169.76 LBS | TEMPERATURE: 98.8 F | OXYGEN SATURATION: 100 % | HEART RATE: 69 BPM | SYSTOLIC BLOOD PRESSURE: 131 MMHG

## 2018-04-23 PROCEDURE — G0452: CPT | Mod: 26

## 2018-04-23 PROCEDURE — 81206 BCR/ABL1 GENE MAJOR BP: CPT

## 2018-05-09 ENCOUNTER — RESULT REVIEW (OUTPATIENT)
Age: 58
End: 2018-05-09

## 2018-05-10 ENCOUNTER — APPOINTMENT (OUTPATIENT)
Dept: DERMATOLOGY | Facility: HOSPITAL | Age: 58
End: 2018-05-10

## 2018-07-27 ENCOUNTER — OUTPATIENT (OUTPATIENT)
Dept: OUTPATIENT SERVICES | Facility: HOSPITAL | Age: 58
LOS: 1 days | Discharge: HOME | End: 2018-07-27

## 2018-07-27 DIAGNOSIS — C92.10 CHRONIC MYELOID LEUKEMIA, BCR/ABL-POSITIVE, NOT HAVING ACHIEVED REMISSION: ICD-10-CM

## 2018-07-27 DIAGNOSIS — Z98.891 HISTORY OF UTERINE SCAR FROM PREVIOUS SURGERY: Chronic | ICD-10-CM

## 2018-08-06 ENCOUNTER — RESULT REVIEW (OUTPATIENT)
Age: 58
End: 2018-08-06

## 2018-08-06 ENCOUNTER — APPOINTMENT (OUTPATIENT)
Dept: HEMATOLOGY ONCOLOGY | Facility: CLINIC | Age: 58
End: 2018-08-06

## 2018-08-06 ENCOUNTER — OUTPATIENT (OUTPATIENT)
Dept: OUTPATIENT SERVICES | Facility: HOSPITAL | Age: 58
LOS: 1 days | End: 2018-08-06
Payer: SELF-PAY

## 2018-08-06 VITALS
SYSTOLIC BLOOD PRESSURE: 98 MMHG | DIASTOLIC BLOOD PRESSURE: 64 MMHG | BODY MASS INDEX: 39.76 KG/M2 | RESPIRATION RATE: 16 BRPM | TEMPERATURE: 98.2 F | HEART RATE: 68 BPM | WEIGHT: 171.06 LBS | OXYGEN SATURATION: 100 %

## 2018-08-06 DIAGNOSIS — Z98.891 HISTORY OF UTERINE SCAR FROM PREVIOUS SURGERY: Chronic | ICD-10-CM

## 2018-08-06 DIAGNOSIS — C92.10 CHRONIC MYELOID LEUKEMIA, BCR/ABL-POSITIVE, NOT HAVING ACHIEVED REMISSION: ICD-10-CM

## 2018-08-06 PROCEDURE — 81206 BCR/ABL1 GENE MAJOR BP: CPT

## 2018-08-06 PROCEDURE — G0452: CPT | Mod: 26

## 2018-08-24 ENCOUNTER — RX RENEWAL (OUTPATIENT)
Age: 58
End: 2018-08-24

## 2018-08-31 ENCOUNTER — APPOINTMENT (OUTPATIENT)
Dept: DERMATOLOGY | Facility: CLINIC | Age: 58
End: 2018-08-31
Payer: MEDICAID

## 2018-08-31 ENCOUNTER — LABORATORY RESULT (OUTPATIENT)
Age: 58
End: 2018-08-31

## 2018-08-31 VITALS — DIASTOLIC BLOOD PRESSURE: 82 MMHG | SYSTOLIC BLOOD PRESSURE: 124 MMHG

## 2018-08-31 PROCEDURE — 99214 OFFICE O/P EST MOD 30 MIN: CPT | Mod: NC,25

## 2018-08-31 PROCEDURE — 11100 BX SKIN SUBCUTANEOUS&/MUCOUS MEMBRANE 1 LESION: CPT | Mod: NC

## 2018-09-04 ENCOUNTER — MEDICATION RENEWAL (OUTPATIENT)
Age: 58
End: 2018-09-04

## 2018-09-17 ENCOUNTER — APPOINTMENT (OUTPATIENT)
Dept: DERMATOLOGY | Facility: CLINIC | Age: 58
End: 2018-09-17
Payer: MEDICAID

## 2018-09-17 PROCEDURE — 99214 OFFICE O/P EST MOD 30 MIN: CPT | Mod: NC

## 2018-10-15 ENCOUNTER — APPOINTMENT (OUTPATIENT)
Dept: DERMATOLOGY | Facility: CLINIC | Age: 58
End: 2018-10-15
Payer: MEDICAID

## 2018-10-15 PROCEDURE — 99214 OFFICE O/P EST MOD 30 MIN: CPT | Mod: NC

## 2018-10-26 ENCOUNTER — OUTPATIENT (OUTPATIENT)
Dept: OUTPATIENT SERVICES | Facility: HOSPITAL | Age: 58
LOS: 1 days | Discharge: HOME | End: 2018-10-26

## 2018-10-26 DIAGNOSIS — C92.10 CHRONIC MYELOID LEUKEMIA, BCR/ABL-POSITIVE, NOT HAVING ACHIEVED REMISSION: ICD-10-CM

## 2018-10-26 DIAGNOSIS — Z98.891 HISTORY OF UTERINE SCAR FROM PREVIOUS SURGERY: Chronic | ICD-10-CM

## 2018-11-05 ENCOUNTER — RESULT REVIEW (OUTPATIENT)
Age: 58
End: 2018-11-05

## 2018-11-05 ENCOUNTER — OUTPATIENT (OUTPATIENT)
Dept: OUTPATIENT SERVICES | Facility: HOSPITAL | Age: 58
LOS: 1 days | End: 2018-11-05
Payer: MEDICAID

## 2018-11-05 ENCOUNTER — APPOINTMENT (OUTPATIENT)
Dept: HEMATOLOGY ONCOLOGY | Facility: CLINIC | Age: 58
End: 2018-11-05

## 2018-11-05 VITALS
SYSTOLIC BLOOD PRESSURE: 149 MMHG | OXYGEN SATURATION: 100 % | WEIGHT: 174.58 LBS | TEMPERATURE: 98.6 F | BODY MASS INDEX: 40.58 KG/M2 | DIASTOLIC BLOOD PRESSURE: 77 MMHG | RESPIRATION RATE: 16 BRPM | HEART RATE: 75 BPM

## 2018-11-05 DIAGNOSIS — C92.10 CHRONIC MYELOID LEUKEMIA, BCR/ABL-POSITIVE, NOT HAVING ACHIEVED REMISSION: ICD-10-CM

## 2018-11-05 DIAGNOSIS — R23.8 OTHER SKIN CHANGES: ICD-10-CM

## 2018-11-05 DIAGNOSIS — Z98.891 HISTORY OF UTERINE SCAR FROM PREVIOUS SURGERY: Chronic | ICD-10-CM

## 2018-11-05 LAB
ALBUMIN SERPL ELPH-MCNC: 4.2 G/DL — SIGNIFICANT CHANGE UP (ref 3.3–5)
ALP SERPL-CCNC: 93 U/L — SIGNIFICANT CHANGE UP (ref 30–120)
ALT FLD-CCNC: 21 U/L — SIGNIFICANT CHANGE UP (ref 10–45)
ANION GAP SERPL CALC-SCNC: 13 MMOL/L — SIGNIFICANT CHANGE UP (ref 5–17)
AST SERPL-CCNC: 25 U/L — SIGNIFICANT CHANGE UP (ref 10–40)
BILIRUB SERPL-MCNC: 0.5 MG/DL — SIGNIFICANT CHANGE UP (ref 0.2–1.2)
BUN SERPL-MCNC: 12 MG/DL — SIGNIFICANT CHANGE UP (ref 7–23)
CALCIUM SERPL-MCNC: 8.9 MG/DL — SIGNIFICANT CHANGE UP (ref 8.4–10.5)
CHLORIDE SERPL-SCNC: 104 MMOL/L — SIGNIFICANT CHANGE UP (ref 96–108)
CO2 SERPL-SCNC: 25 MMOL/L — SIGNIFICANT CHANGE UP (ref 22–31)
CREAT SERPL-MCNC: 0.66 MG/DL — SIGNIFICANT CHANGE UP (ref 0.5–1.3)
GLUCOSE SERPL-MCNC: 84 MG/DL — SIGNIFICANT CHANGE UP (ref 70–99)
POTASSIUM SERPL-MCNC: 4.3 MMOL/L — SIGNIFICANT CHANGE UP (ref 3.5–5.3)
POTASSIUM SERPL-SCNC: 4.3 MMOL/L — SIGNIFICANT CHANGE UP (ref 3.5–5.3)
PROT SERPL-MCNC: 7 G/DL — SIGNIFICANT CHANGE UP (ref 6–8.3)
SODIUM SERPL-SCNC: 142 MMOL/L — SIGNIFICANT CHANGE UP (ref 135–145)

## 2018-11-05 PROCEDURE — G0452: CPT | Mod: 26

## 2018-11-05 PROCEDURE — 81206 BCR/ABL1 GENE MAJOR BP: CPT

## 2018-11-09 LAB — BCR/ABL BY RT - PCR QUANTITATIVE: SIGNIFICANT CHANGE UP

## 2018-11-14 NOTE — ASSESSMENT
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 \par \par 1. CML, chronic phase: asymptomatic at time of diagnosis September 2017 (WBC 83, ). bcr-abl >10% with negative JAK2. Bone marrow biopsy 910/16/2017) with minimal specimen but enough to confirm CML. Imatinib 400mg daily was initiated. Her quant bcr-abl have been followed at 3 month intervals along with her CBC. \par - has had complete hematologic response since initiation of imatinib.\par - we reviewed dietary limitations with imatinib today: excess levels of citrus, most importantly grapefruit juice can cause increased levels of the medication. she was drinking multiple cups of lemon juice which may have exacerbated her nausea which she has chronically though previously tolerated with the imatinib. it was recommended she continue to avoid lemon juice.\par - at the year marielos since initiation of therapy: check CBC and bcr-abl quant today. \par - treatment course was c/b eczematous dermatitis after the 3 month marielos with imatinib temporarily held x 3-4 weeks while being evaluated by dermatology. 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 There are several case reports in the literature of lichen planus associated. Studies have shown skin toxicity in up 17% (all grade) at the 400mg dose. Given the rash is improving and tolerable the patient expresses her desire to continue the imatinib at this time.\par \par #dispo: from [facility] originally ( still there, lives with daughter here). awaiting asylum meeting on 11/30. will follow up with patient after appointment.\par \par \par Discussed with Dr Clark  in Clinic. Dr Harper follows longitudinally.

## 2018-11-14 NOTE — PHYSICAL EXAM
[Restricted in physically strenuous activity but ambulatory and able to carry out work of a light or sedentary nature] : Status 1- Restricted in physically strenuous activity but ambulatory and able to carry out work of a light or sedentary nature, e.g., light house work, office work [Obese] : obese [Normal] : affect appropriate [de-identified] : hyperpigmented patches on face, bilateral arms and shins, scattered hyperpigmented lesions on her lower abdomen.

## 2018-11-14 NOTE — REASON FOR VISIT
[Follow-Up Visit] : a follow-up visit for [[employer] ] : provided by [employer]   [FreeTextEntry1] : [id number] [FreeTextEntry2] : [place]

## 2018-11-14 NOTE — HISTORY OF PRESENT ILLNESS
[Disease:__________________________] : Disease: [unfilled] [___________________________________] : Drug: [unfilled] [de-identified] : 58yo F with a history of HTN who was admitted to [facility] after her WBC on outpatient labs was >80k. She was asymptomatic at the time. Her flow cytometry had 1.2% myeloblasts (HLA-DR , CD 33, CD 34,  positive). BCR-ABL was positive (>10%) and JAK2 was negative. The patient had CT scans of chest/abdomen/pelvis showed splenomegaly (14 cm) and an aortocaval node  measuring 1.9 x 1.4 cm. Bone marrow biopsy done 10/16. Imatinib 400 mg daily initiated. She is intermediate-high risk based on Skokal and Hasford risk calculations [de-identified] : here for a follow up visit. Her main worry is actually her upcoming immigration appointment on 11/30 for asylum status. She reports compliance with the Imatinib though was drinking a lot of lemon juice with it and she thought it was making her rash and nausea worse. She has since stopped the lemon juice with improved GI symptoms. She denies fevers/chills, excessive fatigue, dyspnea on exertion, easy bruising or bleeding.\par \par In terms of the rash she endorses improvement since last visit with use of the creams prescribed by dermatology. Itch is under control. [de-identified] : lichen planus (biopsy proven, 8/31/18)

## 2018-11-14 NOTE — REVIEW OF SYSTEMS
[Vomiting] : vomiting [Skin Rash] : skin rash [Anxiety] : anxiety [Fever] : no fever [Chills] : no chills [Night Sweats] : no night sweats [Recent Change In Weight] : ~T no recent weight change [Vision Problems] : no vision problems [Nosebleeds] : no nosebleeds [Chest Pain] : no chest pain [Shortness Of Breath] : no shortness of breath [Abdominal Pain] : no abdominal pain [Constipation] : no constipation [Diarrhea] : no diarrhea [Joint Pain] : no joint pain [Muscle Pain] : no muscle pain [Dizziness] : no dizziness [Easy Bleeding] : no tendency for easy bleeding [Easy Bruising] : no tendency for easy bruising [Swollen Glands] : no swollen glands

## 2018-11-19 ENCOUNTER — APPOINTMENT (OUTPATIENT)
Dept: DERMATOLOGY | Facility: CLINIC | Age: 58
End: 2018-11-19

## 2018-11-30 ENCOUNTER — MEDICATION RENEWAL (OUTPATIENT)
Age: 58
End: 2018-11-30

## 2018-12-17 ENCOUNTER — OUTPATIENT (OUTPATIENT)
Dept: OUTPATIENT SERVICES | Facility: HOSPITAL | Age: 58
LOS: 1 days | Discharge: HOME | End: 2018-12-17

## 2018-12-17 DIAGNOSIS — Z98.891 HISTORY OF UTERINE SCAR FROM PREVIOUS SURGERY: Chronic | ICD-10-CM

## 2018-12-17 DIAGNOSIS — C92.10 CHRONIC MYELOID LEUKEMIA, BCR/ABL-POSITIVE, NOT HAVING ACHIEVED REMISSION: ICD-10-CM

## 2019-01-25 ENCOUNTER — OUTPATIENT (OUTPATIENT)
Dept: OUTPATIENT SERVICES | Facility: HOSPITAL | Age: 59
LOS: 1 days | Discharge: HOME | End: 2019-01-25

## 2019-01-25 DIAGNOSIS — Z98.891 HISTORY OF UTERINE SCAR FROM PREVIOUS SURGERY: Chronic | ICD-10-CM

## 2019-01-25 DIAGNOSIS — C92.10 CHRONIC MYELOID LEUKEMIA, BCR/ABL-POSITIVE, NOT HAVING ACHIEVED REMISSION: ICD-10-CM

## 2019-02-04 ENCOUNTER — OUTPATIENT (OUTPATIENT)
Dept: OUTPATIENT SERVICES | Facility: HOSPITAL | Age: 59
LOS: 1 days | End: 2019-02-04
Payer: MEDICAID

## 2019-02-04 ENCOUNTER — RESULT REVIEW (OUTPATIENT)
Age: 59
End: 2019-02-04

## 2019-02-04 ENCOUNTER — APPOINTMENT (OUTPATIENT)
Dept: HEMATOLOGY ONCOLOGY | Facility: CLINIC | Age: 59
End: 2019-02-04

## 2019-02-04 VITALS
WEIGHT: 176.37 LBS | OXYGEN SATURATION: 100 % | TEMPERATURE: 98.1 F | RESPIRATION RATE: 16 BRPM | BODY MASS INDEX: 40.99 KG/M2 | DIASTOLIC BLOOD PRESSURE: 84 MMHG | HEART RATE: 64 BPM | SYSTOLIC BLOOD PRESSURE: 137 MMHG

## 2019-02-04 DIAGNOSIS — C92.10 CHRONIC MYELOID LEUKEMIA, BCR/ABL-POSITIVE, NOT HAVING ACHIEVED REMISSION: ICD-10-CM

## 2019-02-04 DIAGNOSIS — Z98.891 HISTORY OF UTERINE SCAR FROM PREVIOUS SURGERY: Chronic | ICD-10-CM

## 2019-02-04 LAB
ALBUMIN SERPL ELPH-MCNC: 4.4 G/DL — SIGNIFICANT CHANGE UP (ref 3.3–5)
ALP SERPL-CCNC: 92 U/L — SIGNIFICANT CHANGE UP (ref 40–120)
ALT FLD-CCNC: 14 U/L — SIGNIFICANT CHANGE UP (ref 10–45)
ANION GAP SERPL CALC-SCNC: 10 MMOL/L — SIGNIFICANT CHANGE UP (ref 5–17)
AST SERPL-CCNC: 19 U/L — SIGNIFICANT CHANGE UP (ref 10–40)
BASOPHILS # BLD AUTO: 0 K/UL — SIGNIFICANT CHANGE UP (ref 0–0.2)
BASOPHILS NFR BLD AUTO: 0.9 % — SIGNIFICANT CHANGE UP (ref 0–2)
BILIRUB SERPL-MCNC: 0.3 MG/DL — SIGNIFICANT CHANGE UP (ref 0.2–1.2)
BUN SERPL-MCNC: 11 MG/DL — SIGNIFICANT CHANGE UP (ref 7–23)
CALCIUM SERPL-MCNC: 9.1 MG/DL — SIGNIFICANT CHANGE UP (ref 8.4–10.5)
CHLORIDE SERPL-SCNC: 105 MMOL/L — SIGNIFICANT CHANGE UP (ref 96–108)
CO2 SERPL-SCNC: 27 MMOL/L — SIGNIFICANT CHANGE UP (ref 22–31)
CREAT SERPL-MCNC: 0.65 MG/DL — SIGNIFICANT CHANGE UP (ref 0.5–1.3)
EOSINOPHIL # BLD AUTO: 0.3 K/UL — SIGNIFICANT CHANGE UP (ref 0–0.5)
EOSINOPHIL NFR BLD AUTO: 6.9 % — HIGH (ref 0–6)
GLUCOSE SERPL-MCNC: 72 MG/DL — SIGNIFICANT CHANGE UP (ref 70–99)
HCT VFR BLD CALC: 36.2 % — SIGNIFICANT CHANGE UP (ref 34.5–45)
HGB BLD-MCNC: 12.6 G/DL — SIGNIFICANT CHANGE UP (ref 11.5–15.5)
LYMPHOCYTES # BLD AUTO: 1.5 K/UL — SIGNIFICANT CHANGE UP (ref 1–3.3)
LYMPHOCYTES # BLD AUTO: 35.6 % — SIGNIFICANT CHANGE UP (ref 13–44)
MCHC RBC-ENTMCNC: 31.2 PG — SIGNIFICANT CHANGE UP (ref 27–34)
MCHC RBC-ENTMCNC: 34.7 G/DL — SIGNIFICANT CHANGE UP (ref 32–36)
MCV RBC AUTO: 89.8 FL — SIGNIFICANT CHANGE UP (ref 80–100)
MONOCYTES # BLD AUTO: 0.3 K/UL — SIGNIFICANT CHANGE UP (ref 0–0.9)
MONOCYTES NFR BLD AUTO: 7.6 % — SIGNIFICANT CHANGE UP (ref 2–14)
NEUTROPHILS # BLD AUTO: 2.1 K/UL — SIGNIFICANT CHANGE UP (ref 1.8–7.4)
NEUTROPHILS NFR BLD AUTO: 49 % — SIGNIFICANT CHANGE UP (ref 43–77)
PLATELET # BLD AUTO: 194 K/UL — SIGNIFICANT CHANGE UP (ref 150–400)
POTASSIUM SERPL-MCNC: 4 MMOL/L — SIGNIFICANT CHANGE UP (ref 3.5–5.3)
POTASSIUM SERPL-SCNC: 4 MMOL/L — SIGNIFICANT CHANGE UP (ref 3.5–5.3)
PROT SERPL-MCNC: 7 G/DL — SIGNIFICANT CHANGE UP (ref 6–8.3)
RBC # BLD: 4.03 M/UL — SIGNIFICANT CHANGE UP (ref 3.8–5.2)
RBC # FLD: 12.8 % — SIGNIFICANT CHANGE UP (ref 10.3–14.5)
SODIUM SERPL-SCNC: 142 MMOL/L — SIGNIFICANT CHANGE UP (ref 135–145)
WBC # BLD: 4.3 K/UL — SIGNIFICANT CHANGE UP (ref 3.8–10.5)
WBC # FLD AUTO: 4.3 K/UL — SIGNIFICANT CHANGE UP (ref 3.8–10.5)

## 2019-02-04 PROCEDURE — 81206 BCR/ABL1 GENE MAJOR BP: CPT

## 2019-02-04 PROCEDURE — G0452: CPT | Mod: 26

## 2019-02-04 RX ORDER — ONDANSETRON 4 MG/1
4 TABLET ORAL
Qty: 24 | Refills: 0 | Status: DISCONTINUED | COMMUNITY
Start: 2018-02-02 | End: 2019-02-04

## 2019-02-05 NOTE — REVIEW OF SYSTEMS
[Skin Rash] : skin rash [Fever] : no fever [Chills] : no chills [Fatigue] : no fatigue [Recent Change In Weight] : ~T no recent weight change [Vision Problems] : no vision problems [Dysphagia] : no dysphagia [Hoarseness] : no hoarseness [Odynophagia] : no odynophagia [Chest Pain] : no chest pain [Shortness Of Breath] : no shortness of breath [Cough] : no cough [Abdominal Pain] : no abdominal pain [Vomiting] : no vomiting [Diarrhea] : no diarrhea [Joint Stiffness] : no joint stiffness [Muscle Pain] : no muscle pain [Difficulty Walking] : no difficulty walking [Easy Bleeding] : no tendency for easy bleeding [Easy Bruising] : no tendency for easy bruising [FreeTextEntry4] : sore throat, no rhinorrhea or nasal congestion. no sinus pressure

## 2019-02-05 NOTE — PHYSICAL EXAM
[Restricted in physically strenuous activity but ambulatory and able to carry out work of a light or sedentary nature] : Status 1- Restricted in physically strenuous activity but ambulatory and able to carry out work of a light or sedentary nature, e.g., light house work, office work [Obese] : obese [Normal] : affect appropriate [Thrush] : no thrush [de-identified] : hyperpigmented patches on face, bilateral arms and shins, scattered hyperpigmented lesions on her lower abdomen and lumbar spine.

## 2019-02-05 NOTE — REASON FOR VISIT
[Follow-Up Visit] : a follow-up visit for [[employer] ] : provided by [employer]   [FreeTextEntry1] : [id number] [FreeTextEntry2] : [place]

## 2019-02-05 NOTE — ASSESSMENT
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 \par \par 1. CML, chronic phase: asymptomatic at time of diagnosis September 2017 (WBC 83, ). bcr-abl >10% with negative JAK2. Bone marrow biopsy 910/16/2017) with minimal specimen but enough to confirm CML. Imatinib 400mg daily was initiated. Her quant bcr-abl have been followed at 3 month intervals along with her CBC. \par - has had complete hematologic response since initiation of imatinib. bcr-abl PCR 0.105% November 2018 (continues to down trend after temporary holding of imatinib while rash was being evaluated)\par - check CBC and bcr-abl quant today. \par -lichen planus 2/2 imatinib: biopsy proven (8/31/2018) lichen planus being managed by dermatology (Dr Lenz). There are several case reports in the literature of lichen planus associated with imatinib. Studies have shown skin toxicity in up 17% (all grade) at the 400mg dose. Rash is stable on exam and does not interfere with her daily activities. continue topical therapies as per derm.\par \par #sore throat - likely a viral URI without fevers, cough, SOB at this time. continue to monitor. recommended patient call if symptoms worsen or she develops a fever.\par \par Discussed with Dr Spring in clinic. Dr Harper follows longitudinally.

## 2019-02-05 NOTE — HISTORY OF PRESENT ILLNESS
[Disease:__________________________] : Disease: [unfilled] [Therapy: ___] : Therapy: [unfilled] [___________________________________] : Drug: [unfilled] [de-identified] : 57yo F with a history of HTN who was admitted to [facility] after her WBC on outpatient labs was >80k. She was asymptomatic at the time. Her flow cytometry had 1.2% myeloblasts (HLA-DR , CD 33, CD 34,  positive). BCR-ABL was positive (>10%) and JAK2 was negative. The patient had CT scans of chest/abdomen/pelvis showed splenomegaly (14 cm) and an aortocaval node  measuring 1.9 x 1.4 cm. Bone marrow biopsy done 10/16. Imatinib 400 mg daily initiated. She is intermediate-high risk based on Skokal and Hasford risk calculations. Treatment course was complicated by imatinib-induced lichen planus (biopsy proven) which is controlled with topical medications while continuing on the imatinib. [de-identified] : She reports a sore throat today but no fevers, night sweats, cough, shortness of breath, dysphagia. Her daughter and granddaughter have also been experiencing URI symptoms. The patient denies chest pain, abdominal pain, vomiting, change in bowel habit, rash, palpable lymph nodes. [de-identified] : lichen planus (biopsy proven, 8/31/18)

## 2019-02-06 LAB — BCR/ABL BY RT - PCR QUANTITATIVE: SIGNIFICANT CHANGE UP

## 2019-04-02 ENCOUNTER — OTHER (OUTPATIENT)
Age: 59
End: 2019-04-02

## 2019-04-02 ENCOUNTER — RX RENEWAL (OUTPATIENT)
Age: 59
End: 2019-04-02

## 2019-04-02 ENCOUNTER — MOBILE ON CALL (OUTPATIENT)
Age: 59
End: 2019-04-02

## 2019-04-11 ENCOUNTER — RECORD ABSTRACTING (OUTPATIENT)
Age: 59
End: 2019-04-11

## 2019-05-03 ENCOUNTER — LABORATORY RESULT (OUTPATIENT)
Age: 59
End: 2019-05-03

## 2019-05-03 ENCOUNTER — APPOINTMENT (OUTPATIENT)
Dept: INTERNAL MEDICINE | Facility: CLINIC | Age: 59
End: 2019-05-03

## 2019-05-03 ENCOUNTER — OUTPATIENT (OUTPATIENT)
Dept: OUTPATIENT SERVICES | Facility: HOSPITAL | Age: 59
LOS: 1 days | End: 2019-05-03
Payer: SELF-PAY

## 2019-05-03 VITALS
DIASTOLIC BLOOD PRESSURE: 70 MMHG | OXYGEN SATURATION: 98 % | HEART RATE: 74 BPM | HEIGHT: 55 IN | WEIGHT: 182 LBS | BODY MASS INDEX: 42.12 KG/M2 | SYSTOLIC BLOOD PRESSURE: 122 MMHG

## 2019-05-03 DIAGNOSIS — I10 ESSENTIAL (PRIMARY) HYPERTENSION: ICD-10-CM

## 2019-05-03 DIAGNOSIS — Z98.891 HISTORY OF UTERINE SCAR FROM PREVIOUS SURGERY: Chronic | ICD-10-CM

## 2019-05-03 PROCEDURE — G0009: CPT

## 2019-05-03 PROCEDURE — 80053 COMPREHEN METABOLIC PANEL: CPT

## 2019-05-03 PROCEDURE — 84443 ASSAY THYROID STIM HORMONE: CPT

## 2019-05-03 PROCEDURE — G0463: CPT

## 2019-05-03 PROCEDURE — 85027 COMPLETE CBC AUTOMATED: CPT

## 2019-05-03 PROCEDURE — 83036 HEMOGLOBIN GLYCOSYLATED A1C: CPT

## 2019-05-03 PROCEDURE — 90670 PCV13 VACCINE IM: CPT

## 2019-05-03 PROCEDURE — 80061 LIPID PANEL: CPT

## 2019-05-03 PROCEDURE — 87389 HIV-1 AG W/HIV-1&-2 AB AG IA: CPT

## 2019-05-03 NOTE — ASSESSMENT
[FreeTextEntry1] : 58yo [place]er-speaking F with hx HTN, morbid obesity, chronic phase CML (dx 9/2017) on oral chemo Imatinib, Imatinib-induced lichen planus presents for CPE.\par \par #HTN\par /70 today\par -continue lisinopril 40mg daily\par -start exercise 4 times/week to increase heart rate\par \par #Rash\par Suspect L forearm cellulitis x 2 days. No itchiness to suspect hives.\par -Will treat Keflex 5-day course\par -Instructed pt and daughter, if rash does not improve, RTC in 1 week to followup rash. May need to see dermatology\par -No topical steroids over area, check CBC\par \par #Morbid obesity\par -nutrition referral, start exercise\par -check TSH\par \par #HCM\par Tdap utd. Prevnar today b/c on chemotherapy, needs Pneumovax in 1 year\par GI referral given for screening colo\par Mammo referral given\par Pap/HPV neg 10/2017\par Depression screen neg\par Check CMP, A1c, lipid profile, TSH, HIV\par \par D/w Dr. Gallagher\par RTC 3 months for BP check. RTC 1 week for rash followup if does not resolve

## 2019-05-03 NOTE — REVIEW OF SYSTEMS
[Sore Throat] : sore throat [Vomiting] : vomiting [Skin Rash] : skin rash [Negative] : Heme/Lymph [Earache] : no earache [Hearing Loss] : no hearing loss [Nosebleed] : no nosebleeds [Hoarseness] : no hoarseness [Nasal Discharge] : no nasal discharge [Postnasal Drip] : no postnasal drip [Nausea] : no nausea [Abdominal Pain] : no abdominal pain [Constipation] : no constipation [Diarrhea] : diarrhea [Melena] : no melena [Heartburn] : no heartburn [Itching] : no itching [Mole Changes] : no mole changes [Hair Changes] : no hair changes [Nail Changes] : no nail changes

## 2019-05-03 NOTE — PHYSICAL EXAM
[No Acute Distress] : no acute distress [Well Developed] : well developed [Well Nourished] : well nourished [Normal Sclera/Conjunctiva] : normal sclera/conjunctiva [PERRL] : pupils equal round and reactive to light [Normal Outer Ear/Nose] : the outer ears and nose were normal in appearance [Normal Oropharynx] : the oropharynx was normal [No Respiratory Distress] : no respiratory distress  [No Lymphadenopathy] : no lymphadenopathy [Supple] : supple [Clear to Auscultation] : lungs were clear to auscultation bilaterally [No Accessory Muscle Use] : no accessory muscle use [Regular Rhythm] : with a regular rhythm [Normal Rate] : normal rate  [Normal S1, S2] : normal S1 and S2 [No Murmur] : no murmur heard [No Edema] : there was no peripheral edema [Soft] : abdomen soft [Non Tender] : non-tender [Non-distended] : non-distended [No Masses] : no abdominal mass palpated [Normal Bowel Sounds] : normal bowel sounds [No HSM] : no HSM [Normal Supraclavicular Nodes] : no supraclavicular lymphadenopathy [Normal Axillary Nodes] : no axillary lymphadenopathy [Normal Posterior Cervical Nodes] : no posterior cervical lymphadenopathy [Normal Anterior Cervical Nodes] : no anterior cervical lymphadenopathy [No Spinal Tenderness] : no spinal tenderness [No CVA Tenderness] : no CVA  tenderness [No Joint Swelling] : no joint swelling [Grossly Normal Strength/Tone] : grossly normal strength/tone [Normal Gait] : normal gait [No Focal Deficits] : no focal deficits [Normal Affect] : the affect was normal [Normal Insight/Judgement] : insight and judgment were intact [de-identified] : 6x4cm increased warmth, erythematous, swollen, nontender, nonpruritic rash left distal forearm. Nontender hyperpigmented plaques over face, arms, legs.

## 2019-05-03 NOTE — HISTORY OF PRESENT ILLNESS
[de-identified] : 60yo Sri Lankan-speaking F with hx HTN, morbid obesity, chronic phase CML (dx 9/2017) on oral chemo Imatinib, Imatinib-induced lichen planus presents for CPE.  # [id number]. Daughter Penny at bedside\par \par No complaints. /70 today on lisinopril. Due to chemotherapy she vomits sometimes which causes discomfort in her throat. Emesis looks like food. Denies dysphagia, odynophagia.\par \par Developed red, swollen, hot wash on left wrist yesterday. It is not painful or itchy. She denies breakage of skin, trauma to area, bug bite, fever.\par \par SH: Lives with daughter Penny. No tob, etoh, illicit drugs. Moved from [facility] 2017.

## 2019-05-04 LAB
ALBUMIN SERPL ELPH-MCNC: 4.2 G/DL — SIGNIFICANT CHANGE UP (ref 3.3–5)
ALP SERPL-CCNC: 90 U/L — SIGNIFICANT CHANGE UP (ref 40–120)
ALT FLD-CCNC: 15 U/L — SIGNIFICANT CHANGE UP (ref 10–45)
ANION GAP SERPL CALC-SCNC: 15 MMOL/L — SIGNIFICANT CHANGE UP (ref 5–17)
AST SERPL-CCNC: 20 U/L — SIGNIFICANT CHANGE UP (ref 10–40)
BILIRUB SERPL-MCNC: 0.2 MG/DL — SIGNIFICANT CHANGE UP (ref 0.2–1.2)
BUN SERPL-MCNC: 12 MG/DL — SIGNIFICANT CHANGE UP (ref 7–23)
CALCIUM SERPL-MCNC: 9 MG/DL — SIGNIFICANT CHANGE UP (ref 8.4–10.5)
CHLORIDE SERPL-SCNC: 108 MMOL/L — SIGNIFICANT CHANGE UP (ref 96–108)
CHOLEST SERPL-MCNC: 121 MG/DL — SIGNIFICANT CHANGE UP (ref 10–199)
CO2 SERPL-SCNC: 20 MMOL/L — LOW (ref 22–31)
CREAT SERPL-MCNC: 0.65 MG/DL — SIGNIFICANT CHANGE UP (ref 0.5–1.3)
ESTIMATED AVERAGE GLUCOSE: 105 MG/DL — SIGNIFICANT CHANGE UP (ref 68–114)
GLUCOSE SERPL-MCNC: 90 MG/DL — SIGNIFICANT CHANGE UP (ref 70–99)
HBA1C BLD-MCNC: 5.3 % — SIGNIFICANT CHANGE UP (ref 4–5.6)
HCT VFR BLD CALC: 40 % — SIGNIFICANT CHANGE UP (ref 34.5–45)
HDLC SERPL-MCNC: 50 MG/DL — SIGNIFICANT CHANGE UP
HGB BLD-MCNC: 12.4 G/DL — SIGNIFICANT CHANGE UP (ref 11.5–15.5)
HIV 1+2 AB+HIV1 P24 AG SERPL QL IA: SIGNIFICANT CHANGE UP
LIPID PNL WITH DIRECT LDL SERPL: 47 MG/DL — SIGNIFICANT CHANGE UP
MCHC RBC-ENTMCNC: 30.8 PG — SIGNIFICANT CHANGE UP (ref 27–34)
MCHC RBC-ENTMCNC: 31 GM/DL — LOW (ref 32–36)
MCV RBC AUTO: 99.5 FL — SIGNIFICANT CHANGE UP (ref 80–100)
PLATELET # BLD AUTO: 183 K/UL — SIGNIFICANT CHANGE UP (ref 150–400)
POTASSIUM SERPL-MCNC: 4.1 MMOL/L — SIGNIFICANT CHANGE UP (ref 3.5–5.3)
POTASSIUM SERPL-SCNC: 4.1 MMOL/L — SIGNIFICANT CHANGE UP (ref 3.5–5.3)
PROT SERPL-MCNC: 7.1 G/DL — SIGNIFICANT CHANGE UP (ref 6–8.3)
RBC # BLD: 4.02 M/UL — SIGNIFICANT CHANGE UP (ref 3.8–5.2)
RBC # FLD: 15 % — HIGH (ref 10.3–14.5)
SODIUM SERPL-SCNC: 143 MMOL/L — SIGNIFICANT CHANGE UP (ref 135–145)
T4 FREE+ TSH PNL SERPL: 2.5 UIU/ML — SIGNIFICANT CHANGE UP (ref 0.27–4.2)
TOTAL CHOLESTEROL/HDL RATIO MEASUREMENT: 2.4 RATIO — LOW (ref 3.3–7.1)
TRIGL SERPL-MCNC: 119 MG/DL — SIGNIFICANT CHANGE UP (ref 10–149)
WBC # BLD: 4.69 K/UL — SIGNIFICANT CHANGE UP (ref 3.8–10.5)
WBC # FLD AUTO: 4.69 K/UL — SIGNIFICANT CHANGE UP (ref 3.8–10.5)

## 2019-05-07 ENCOUNTER — OUTPATIENT (OUTPATIENT)
Dept: OUTPATIENT SERVICES | Facility: HOSPITAL | Age: 59
LOS: 1 days | Discharge: HOME | End: 2019-05-07

## 2019-05-07 DIAGNOSIS — Z98.891 HISTORY OF UTERINE SCAR FROM PREVIOUS SURGERY: Chronic | ICD-10-CM

## 2019-05-07 DIAGNOSIS — C92.10 CHRONIC MYELOID LEUKEMIA, BCR/ABL-POSITIVE, NOT HAVING ACHIEVED REMISSION: ICD-10-CM

## 2019-05-08 DIAGNOSIS — L81.9 DISORDER OF PIGMENTATION, UNSPECIFIED: ICD-10-CM

## 2019-05-08 DIAGNOSIS — L43.9 LICHEN PLANUS, UNSPECIFIED: ICD-10-CM

## 2019-05-08 DIAGNOSIS — L03.114 CELLULITIS OF LEFT UPPER LIMB: ICD-10-CM

## 2019-05-08 DIAGNOSIS — C92.10 CHRONIC MYELOID LEUKEMIA, BCR/ABL-POSITIVE, NOT HAVING ACHIEVED REMISSION: ICD-10-CM

## 2019-05-08 DIAGNOSIS — E66.01 MORBID (SEVERE) OBESITY DUE TO EXCESS CALORIES: ICD-10-CM

## 2019-05-09 ENCOUNTER — RESULT REVIEW (OUTPATIENT)
Age: 59
End: 2019-05-09

## 2019-05-09 ENCOUNTER — APPOINTMENT (OUTPATIENT)
Dept: HEMATOLOGY ONCOLOGY | Facility: CLINIC | Age: 59
End: 2019-05-09

## 2019-05-09 ENCOUNTER — OUTPATIENT (OUTPATIENT)
Dept: OUTPATIENT SERVICES | Facility: HOSPITAL | Age: 59
LOS: 1 days | End: 2019-05-09
Payer: MEDICAID

## 2019-05-09 VITALS
BODY MASS INDEX: 42.84 KG/M2 | WEIGHT: 184.28 LBS | RESPIRATION RATE: 14 BRPM | DIASTOLIC BLOOD PRESSURE: 83 MMHG | SYSTOLIC BLOOD PRESSURE: 126 MMHG | TEMPERATURE: 98.7 F | HEART RATE: 64 BPM | OXYGEN SATURATION: 100 %

## 2019-05-09 DIAGNOSIS — Z98.891 HISTORY OF UTERINE SCAR FROM PREVIOUS SURGERY: Chronic | ICD-10-CM

## 2019-05-09 DIAGNOSIS — C92.10 CHRONIC MYELOID LEUKEMIA, BCR/ABL-POSITIVE, NOT HAVING ACHIEVED REMISSION: ICD-10-CM

## 2019-05-09 PROCEDURE — G0452: CPT | Mod: 26

## 2019-05-09 PROCEDURE — 81206 BCR/ABL1 GENE MAJOR BP: CPT

## 2019-05-09 RX ORDER — CEPHALEXIN 500 MG/1
500 CAPSULE ORAL 4 TIMES DAILY
Qty: 20 | Refills: 0 | Status: DISCONTINUED | COMMUNITY
Start: 2019-05-03 | End: 2019-05-09

## 2019-05-09 NOTE — REVIEW OF SYSTEMS
[Recent Change In Weight] : ~T recent weight change [Skin Rash] : skin rash [Fever] : no fever [Chills] : no chills [Night Sweats] : no night sweats [Vision Problems] : no vision problems [Nosebleeds] : no nosebleeds [Chest Pain] : no chest pain [Shortness Of Breath] : no shortness of breath [Cough] : no cough [Abdominal Pain] : no abdominal pain [Joint Pain] : no joint pain [Difficulty Walking] : no difficulty walking [Easy Bleeding] : no tendency for easy bleeding [Easy Bruising] : no tendency for easy bruising [Swollen Glands] : no swollen glands [FreeTextEntry7] : i

## 2019-05-09 NOTE — PHYSICAL EXAM
[Restricted in physically strenuous activity but ambulatory and able to carry out work of a light or sedentary nature] : Status 1- Restricted in physically strenuous activity but ambulatory and able to carry out work of a light or sedentary nature, e.g., light house work, office work [Obese] : obese [Normal] : affect appropriate [Thrush] : no thrush [de-identified] : hyperpigmented patches on face, distal upper extremities and shins, improved since last visit

## 2019-05-09 NOTE — ASSESSMENT
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 \par \par 1. CML, chronic phase: asymptomatic at time of diagnosis September 2017 (WBC 83, ). bcr-abl >10% with negative JAK2. Bone marrow biopsy (10/16/2017) with minimal specimen but enough to confirm CML. Imatinib 400mg daily was initiated. Her quant bcr-abl has been followed at 3 month intervals along with her CBC. \par - has had complete hematologic response since initiation of imatinib. bcr-abl initially improving until imatinib was held for evaluation of her lichen planus. Once the imatinib was restarted her bcr-abl started to downtrend again. PCR 0.043% February 2019\par - CBC drawn 5/3/19 at PCP office, reviewed with WBC/HGB/PLTs within normal limits. quant BCR-ABL ordered today.\par \par 2. 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 \par -There are several case reports in the literature of lichen planus associated with imatinib. Studies have shown skin toxicity in up 17% (all grade) at the 400mg dose. \par -Rash is steadily improving with each visit and does not interfere with her daily activities. continue topical therapies as per derm.\par \par Discussed with Dr Bueno in clinic. Dr Harper follows longitudinally.

## 2019-05-09 NOTE — HISTORY OF PRESENT ILLNESS
[Disease:__________________________] : Disease: [unfilled] [Therapy: ___] : Therapy: [unfilled] [___________________________________] : Drug: [unfilled] [Date: ____________] : Patient's last distress assessment performed on [unfilled]. [0 - No Distress] : Distress Level: 0 [de-identified] : 58yo F originally from [facility] with a history of HTN who was admitted to [facility] after her WBC on outpatient labs was >80k. She was asymptomatic at the time. Her flow cytometry had 1.2% myeloblasts (HLA-DR , CD 33, CD 34,  positive). BCR-ABL was positive (>10%) and JAK2 was negative. The patient had CT scans of chest/abdomen/pelvis showed splenomegaly (14 cm) and an aortocaval node  measuring 1.9 x 1.4 cm. Bone marrow biopsy done 10/16. Imatinib 400 mg daily initiated. She is intermediate-high risk based on Skokal and Hasford risk calculations. Treatment course was complicated by imatinib-induced lichen planus (biopsy proven) which is controlled with topical medications while continuing on the imatinib. [de-identified] : here for a follow up visit. was seen by her PCP last week. Today she reports no change since last visit. Denies fevers/chills, night sweats, abdominal pain. Her chronic rash is continuing to improve. While at the PCP there was concern for cellulitis of the left forearm but Ms/ Joel reports she intermittently gets red bumps that come and go so she did not take the prescribed antibiotic.  [de-identified] : lichen planus (biopsy proven, 8/31/18)

## 2019-05-09 NOTE — REASON FOR VISIT
[Follow-Up Visit] : a follow-up visit for [[employer] ] : provided by [employer]   [FreeTextEntry1] : [id number] [FreeTextEntry2] : Luis

## 2019-05-10 ENCOUNTER — APPOINTMENT (OUTPATIENT)
Dept: INTERNAL MEDICINE | Facility: CLINIC | Age: 59
End: 2019-05-10

## 2019-05-10 ENCOUNTER — OUTPATIENT (OUTPATIENT)
Dept: OUTPATIENT SERVICES | Facility: HOSPITAL | Age: 59
LOS: 1 days | End: 2019-05-10
Payer: SELF-PAY

## 2019-05-10 DIAGNOSIS — Z98.891 HISTORY OF UTERINE SCAR FROM PREVIOUS SURGERY: Chronic | ICD-10-CM

## 2019-05-10 DIAGNOSIS — I10 ESSENTIAL (PRIMARY) HYPERTENSION: ICD-10-CM

## 2019-05-10 PROCEDURE — 97802 MEDICAL NUTRITION INDIV IN: CPT

## 2019-05-13 LAB — BCR/ABL BY RT - PCR QUANTITATIVE: SIGNIFICANT CHANGE UP

## 2019-05-16 DIAGNOSIS — E66.01 MORBID (SEVERE) OBESITY DUE TO EXCESS CALORIES: ICD-10-CM

## 2019-06-04 ENCOUNTER — FORM ENCOUNTER (OUTPATIENT)
Age: 59
End: 2019-06-04

## 2019-06-05 ENCOUNTER — APPOINTMENT (OUTPATIENT)
Dept: ULTRASOUND IMAGING | Facility: CLINIC | Age: 59
End: 2019-06-05
Payer: COMMERCIAL

## 2019-06-05 ENCOUNTER — APPOINTMENT (OUTPATIENT)
Dept: MAMMOGRAPHY | Facility: CLINIC | Age: 59
End: 2019-06-05
Payer: COMMERCIAL

## 2019-06-05 ENCOUNTER — OUTPATIENT (OUTPATIENT)
Dept: OUTPATIENT SERVICES | Facility: HOSPITAL | Age: 59
LOS: 1 days | End: 2019-06-05

## 2019-06-05 DIAGNOSIS — Z98.891 HISTORY OF UTERINE SCAR FROM PREVIOUS SURGERY: Chronic | ICD-10-CM

## 2019-06-05 DIAGNOSIS — E66.01 MORBID (SEVERE) OBESITY DUE TO EXCESS CALORIES: ICD-10-CM

## 2019-06-05 PROCEDURE — 77066 DX MAMMO INCL CAD BI: CPT | Mod: 26

## 2019-06-05 PROCEDURE — 76641 ULTRASOUND BREAST COMPLETE: CPT | Mod: 26,50

## 2019-06-05 PROCEDURE — G0279: CPT | Mod: 26

## 2019-06-14 ENCOUNTER — OUTPATIENT (OUTPATIENT)
Dept: OUTPATIENT SERVICES | Facility: HOSPITAL | Age: 59
LOS: 1 days | End: 2019-06-14
Payer: SELF-PAY

## 2019-06-14 ENCOUNTER — APPOINTMENT (OUTPATIENT)
Dept: INTERNAL MEDICINE | Facility: CLINIC | Age: 59
End: 2019-06-14

## 2019-06-14 DIAGNOSIS — Z98.891 HISTORY OF UTERINE SCAR FROM PREVIOUS SURGERY: Chronic | ICD-10-CM

## 2019-06-14 DIAGNOSIS — C92.10 CHRONIC MYELOID LEUKEMIA, BCR/ABL-POSITIVE, NOT HAVING ACHIEVED REMISSION: ICD-10-CM

## 2019-06-14 DIAGNOSIS — L03.114 CELLULITIS OF LEFT UPPER LIMB: ICD-10-CM

## 2019-06-14 DIAGNOSIS — I10 ESSENTIAL (PRIMARY) HYPERTENSION: ICD-10-CM

## 2019-06-14 DIAGNOSIS — L81.9 DISORDER OF PIGMENTATION, UNSPECIFIED: ICD-10-CM

## 2019-06-14 DIAGNOSIS — L43.9 LICHEN PLANUS, UNSPECIFIED: ICD-10-CM

## 2019-06-14 PROCEDURE — 97803 MED NUTRITION INDIV SUBSEQ: CPT

## 2019-06-25 DIAGNOSIS — E66.01 MORBID (SEVERE) OBESITY DUE TO EXCESS CALORIES: ICD-10-CM

## 2019-07-12 ENCOUNTER — OUTPATIENT (OUTPATIENT)
Dept: OUTPATIENT SERVICES | Facility: HOSPITAL | Age: 59
LOS: 1 days | End: 2019-07-12
Payer: SELF-PAY

## 2019-07-12 ENCOUNTER — APPOINTMENT (OUTPATIENT)
Dept: INTERNAL MEDICINE | Facility: CLINIC | Age: 59
End: 2019-07-12

## 2019-07-12 DIAGNOSIS — I10 ESSENTIAL (PRIMARY) HYPERTENSION: ICD-10-CM

## 2019-07-12 DIAGNOSIS — Z98.891 HISTORY OF UTERINE SCAR FROM PREVIOUS SURGERY: Chronic | ICD-10-CM

## 2019-07-12 PROCEDURE — 97803 MED NUTRITION INDIV SUBSEQ: CPT

## 2019-07-18 DIAGNOSIS — E66.01 MORBID (SEVERE) OBESITY DUE TO EXCESS CALORIES: ICD-10-CM

## 2019-08-16 ENCOUNTER — APPOINTMENT (OUTPATIENT)
Dept: INTERNAL MEDICINE | Facility: CLINIC | Age: 59
End: 2019-08-16

## 2019-08-21 ENCOUNTER — OUTPATIENT (OUTPATIENT)
Dept: OUTPATIENT SERVICES | Facility: HOSPITAL | Age: 59
LOS: 1 days | Discharge: HOME | End: 2019-08-21

## 2019-08-21 DIAGNOSIS — Z98.891 HISTORY OF UTERINE SCAR FROM PREVIOUS SURGERY: Chronic | ICD-10-CM

## 2019-08-21 DIAGNOSIS — C92.10 CHRONIC MYELOID LEUKEMIA, BCR/ABL-POSITIVE, NOT HAVING ACHIEVED REMISSION: ICD-10-CM

## 2019-08-22 ENCOUNTER — RESULT REVIEW (OUTPATIENT)
Age: 59
End: 2019-08-22

## 2019-08-22 ENCOUNTER — OUTPATIENT (OUTPATIENT)
Dept: OUTPATIENT SERVICES | Facility: HOSPITAL | Age: 59
LOS: 1 days | End: 2019-08-22
Payer: MEDICAID

## 2019-08-22 ENCOUNTER — APPOINTMENT (OUTPATIENT)
Dept: HEMATOLOGY ONCOLOGY | Facility: CLINIC | Age: 59
End: 2019-08-22

## 2019-08-22 VITALS
OXYGEN SATURATION: 100 % | DIASTOLIC BLOOD PRESSURE: 84 MMHG | HEART RATE: 63 BPM | TEMPERATURE: 98.5 F | SYSTOLIC BLOOD PRESSURE: 141 MMHG | BODY MASS INDEX: 41.5 KG/M2 | RESPIRATION RATE: 16 BRPM | WEIGHT: 178.57 LBS

## 2019-08-22 DIAGNOSIS — Z86.03 PERSONAL HISTORY OF NEOPLASM OF UNCERTAIN BEHAVIOR: ICD-10-CM

## 2019-08-22 DIAGNOSIS — L43.9 LICHEN PLANUS, UNSPECIFIED: ICD-10-CM

## 2019-08-22 DIAGNOSIS — L03.114 CELLULITIS OF LEFT UPPER LIMB: ICD-10-CM

## 2019-08-22 DIAGNOSIS — L30.9 DERMATITIS, UNSPECIFIED: ICD-10-CM

## 2019-08-22 DIAGNOSIS — C92.10 CHRONIC MYELOID LEUKEMIA, BCR/ABL-POSITIVE, NOT HAVING ACHIEVED REMISSION: ICD-10-CM

## 2019-08-22 DIAGNOSIS — Z98.891 HISTORY OF UTERINE SCAR FROM PREVIOUS SURGERY: Chronic | ICD-10-CM

## 2019-08-22 LAB
ALBUMIN SERPL ELPH-MCNC: 4.4 G/DL — SIGNIFICANT CHANGE UP (ref 3.3–5)
ALP SERPL-CCNC: 84 U/L — SIGNIFICANT CHANGE UP (ref 40–120)
ALT FLD-CCNC: 15 U/L — SIGNIFICANT CHANGE UP (ref 10–45)
ANION GAP SERPL CALC-SCNC: 15 MMOL/L — SIGNIFICANT CHANGE UP (ref 5–17)
AST SERPL-CCNC: 17 U/L — SIGNIFICANT CHANGE UP (ref 10–40)
BASOPHILS # BLD AUTO: 0 K/UL — SIGNIFICANT CHANGE UP (ref 0–0.2)
BASOPHILS NFR BLD AUTO: 0.5 % — SIGNIFICANT CHANGE UP (ref 0–2)
BILIRUB SERPL-MCNC: 0.4 MG/DL — SIGNIFICANT CHANGE UP (ref 0.2–1.2)
BUN SERPL-MCNC: 10 MG/DL — SIGNIFICANT CHANGE UP (ref 7–23)
CALCIUM SERPL-MCNC: 9 MG/DL — SIGNIFICANT CHANGE UP (ref 8.4–10.5)
CHLORIDE SERPL-SCNC: 104 MMOL/L — SIGNIFICANT CHANGE UP (ref 96–108)
CO2 SERPL-SCNC: 25 MMOL/L — SIGNIFICANT CHANGE UP (ref 22–31)
CREAT SERPL-MCNC: 0.72 MG/DL — SIGNIFICANT CHANGE UP (ref 0.5–1.3)
EOSINOPHIL # BLD AUTO: 0.2 K/UL — SIGNIFICANT CHANGE UP (ref 0–0.5)
EOSINOPHIL NFR BLD AUTO: 5.3 % — SIGNIFICANT CHANGE UP (ref 0–6)
GLUCOSE SERPL-MCNC: 92 MG/DL — SIGNIFICANT CHANGE UP (ref 70–99)
HCT VFR BLD CALC: 38 % — SIGNIFICANT CHANGE UP (ref 34.5–45)
HGB BLD-MCNC: 12.6 G/DL — SIGNIFICANT CHANGE UP (ref 11.5–15.5)
LYMPHOCYTES # BLD AUTO: 1.8 K/UL — SIGNIFICANT CHANGE UP (ref 1–3.3)
LYMPHOCYTES # BLD AUTO: 40.1 % — SIGNIFICANT CHANGE UP (ref 13–44)
MCHC RBC-ENTMCNC: 31 PG — SIGNIFICANT CHANGE UP (ref 27–34)
MCHC RBC-ENTMCNC: 33.2 G/DL — SIGNIFICANT CHANGE UP (ref 32–36)
MCV RBC AUTO: 93.3 FL — SIGNIFICANT CHANGE UP (ref 80–100)
MONOCYTES # BLD AUTO: 0.3 K/UL — SIGNIFICANT CHANGE UP (ref 0–0.9)
MONOCYTES NFR BLD AUTO: 6.7 % — SIGNIFICANT CHANGE UP (ref 2–14)
NEUTROPHILS # BLD AUTO: 2.1 K/UL — SIGNIFICANT CHANGE UP (ref 1.8–7.4)
NEUTROPHILS NFR BLD AUTO: 47.4 % — SIGNIFICANT CHANGE UP (ref 43–77)
PLATELET # BLD AUTO: 187 K/UL — SIGNIFICANT CHANGE UP (ref 150–400)
POTASSIUM SERPL-MCNC: 4.2 MMOL/L — SIGNIFICANT CHANGE UP (ref 3.5–5.3)
POTASSIUM SERPL-SCNC: 4.2 MMOL/L — SIGNIFICANT CHANGE UP (ref 3.5–5.3)
PROT SERPL-MCNC: 6.7 G/DL — SIGNIFICANT CHANGE UP (ref 6–8.3)
RBC # BLD: 4.08 M/UL — SIGNIFICANT CHANGE UP (ref 3.8–5.2)
RBC # FLD: 14.3 % — SIGNIFICANT CHANGE UP (ref 10.3–14.5)
SODIUM SERPL-SCNC: 144 MMOL/L — SIGNIFICANT CHANGE UP (ref 135–145)
WBC # BLD: 4.5 K/UL — SIGNIFICANT CHANGE UP (ref 3.8–10.5)
WBC # FLD AUTO: 4.5 K/UL — SIGNIFICANT CHANGE UP (ref 3.8–10.5)

## 2019-08-22 PROCEDURE — G0452: CPT | Mod: 26

## 2019-08-22 PROCEDURE — 81206 BCR/ABL1 GENE MAJOR BP: CPT

## 2019-08-22 RX ORDER — TRIAMCINOLONE ACETONIDE 1 MG/G
0.1 OINTMENT TOPICAL TWICE DAILY
Qty: 1 | Refills: 1 | Status: DISCONTINUED | COMMUNITY
Start: 2018-03-16 | End: 2019-08-22

## 2019-08-22 RX ORDER — HYDROXYZINE HYDROCHLORIDE 10 MG/1
10 TABLET ORAL
Qty: 45 | Refills: 0 | Status: DISCONTINUED | COMMUNITY
Start: 2018-08-31 | End: 2019-08-22

## 2019-08-26 NOTE — REASON FOR VISIT
[Follow-Up Visit] : a follow-up visit for [[employer] ] : provided by [employer]   [Chronic Leukemia] : chronic leukemia [FreeTextEntry1] : [id number] [FreeTextEntry2] : Mayur

## 2019-08-26 NOTE — PHYSICAL EXAM
[Restricted in physically strenuous activity but ambulatory and able to carry out work of a light or sedentary nature] : Status 1- Restricted in physically strenuous activity but ambulatory and able to carry out work of a light or sedentary nature, e.g., light house work, office work [Obese] : obese [Normal] : affect appropriate [Thrush] : no thrush [de-identified] : hyperpigmented patches on face, distal upper extremities and shins, improved since last visit

## 2019-08-26 NOTE — HISTORY OF PRESENT ILLNESS
[Disease:__________________________] : Disease: [unfilled] [Therapy: ___] : Therapy: [unfilled] [___________________________________] : Drug: [unfilled] [de-identified] : 58yo F originally from [facility] with a history of HTN who was admitted to [facility] after her WBC on outpatient labs was >80k. She was asymptomatic at the time. Her flow cytometry had 1.2% myeloblasts (HLA-DR , CD 33, CD 34,  positive). BCR-ABL was positive (>10%) and JAK2 was negative. The patient had CT scans of chest/abdomen/pelvis showed splenomegaly (14 cm) and an aortocaval node  measuring 1.9 x 1.4 cm. Bone marrow biopsy done 10/16. Imatinib 400 mg daily initiated. She is intermediate-high risk based on Skokal and Hasford risk calculations. Treatment course was complicated by imatinib-induced lichen planus (biopsy proven) which is controlled with topical medications while continuing on the imatinib. [de-identified] : intermediate risk  [FreeTextEntry1] : imatinib  [de-identified] : here today for a follow up visit. She has no new complaints. The rash on her arms and face continues to improve. She rarely uses the prescribed topical creams but does use aloe vera on the arms. With the tablet she initially had GI upset including vomiting at the start of the tablet but it no longer happens. She does express worry over her family still in [facility], including her . [de-identified] : lichen planus (biopsy proven, 8/31/18) Epidermal Closure Graft Donor Site (Optional): simple interrupted

## 2019-08-26 NOTE — REVIEW OF SYSTEMS
[Vision Problems] : vision problems [Skin Rash] : skin rash [Anxiety] : anxiety [Fever] : no fever [Fatigue] : no fatigue [Recent Change In Weight] : ~T no recent weight change [Dysphagia] : no dysphagia [Chest Pain] : no chest pain [Lower Ext Edema] : no lower extremity edema [Cough] : no cough [Abdominal Pain] : no abdominal pain [Constipation] : no constipation [Diarrhea] : no diarrhea [Joint Stiffness] : no joint stiffness [Muscle Pain] : no muscle pain [Difficulty Walking] : no difficulty walking [Easy Bleeding] : no tendency for easy bleeding [Easy Bruising] : no tendency for easy bruising [Swollen Glands] : no swollen glands

## 2019-08-26 NOTE — ASSESSMENT
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 \par \par 1. CML, chronic phase: asymptomatic at time of diagnosis September 2017 (WBC 83, ). bcr-abl >10% with negative JAK2. Bone marrow biopsy (10/16/2017) with minimal specimen but enough to confirm CML. Imatinib 400mg daily was initiated. Her quant bcr-abl has been followed at 3 month intervals along with her CBC. \par - has had complete hematologic response since initiation of imatinib. \par -bcr-abl initially improving until imatinib was held for evaluation of her eventually diagnosed lichen planus. Once the imatinib was restarted her bcr-abl started to downtrend again though has remained detectable (<1%).\par - CBC today with WBC 4.5, HGB 12.6, PLTs 187. quant BCR-ABL 0.045% May 2019 - to repeat today.\par \par 2. 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 \par -There are several case reports in the literature of lichen planus associated with imatinib. Studies have shown skin toxicity in up 17% (all grade) at the 400mg dose. \par -Rash is steadily improving with each visit and does not interfere with her daily activities. she reports no longer taking most of the prescribed but OTC creams as needed.\par \par Discussed with Dr Stephens in clinic. \par Patient to be transitioned to Dr. Myrick's for next visit in 3 months. This was discussed with the patient today.

## 2019-08-28 LAB — BCR/ABL BY RT - PCR QUANTITATIVE: SIGNIFICANT CHANGE UP

## 2019-09-03 ENCOUNTER — RX RENEWAL (OUTPATIENT)
Age: 59
End: 2019-09-03

## 2019-11-12 ENCOUNTER — OUTPATIENT (OUTPATIENT)
Dept: OUTPATIENT SERVICES | Facility: HOSPITAL | Age: 59
LOS: 1 days | End: 2019-11-12
Payer: SELF-PAY

## 2019-11-12 ENCOUNTER — APPOINTMENT (OUTPATIENT)
Dept: INTERNAL MEDICINE | Facility: CLINIC | Age: 59
End: 2019-11-12

## 2019-11-12 ENCOUNTER — LABORATORY RESULT (OUTPATIENT)
Age: 59
End: 2019-11-12

## 2019-11-12 VITALS
BODY MASS INDEX: 41.37 KG/M2 | SYSTOLIC BLOOD PRESSURE: 122 MMHG | HEART RATE: 72 BPM | WEIGHT: 178 LBS | OXYGEN SATURATION: 99 % | DIASTOLIC BLOOD PRESSURE: 80 MMHG

## 2019-11-12 DIAGNOSIS — E66.01 MORBID (SEVERE) OBESITY DUE TO EXCESS CALORIES: ICD-10-CM

## 2019-11-12 DIAGNOSIS — Z98.891 HISTORY OF UTERINE SCAR FROM PREVIOUS SURGERY: Chronic | ICD-10-CM

## 2019-11-12 DIAGNOSIS — I10 ESSENTIAL (PRIMARY) HYPERTENSION: ICD-10-CM

## 2019-11-12 PROCEDURE — 80048 BASIC METABOLIC PNL TOTAL CA: CPT

## 2019-11-12 PROCEDURE — G0008: CPT

## 2019-11-12 PROCEDURE — G0463: CPT | Mod: 25

## 2019-11-12 PROCEDURE — 90688 IIV4 VACCINE SPLT 0.5 ML IM: CPT

## 2019-11-12 RX ORDER — MOMETASONE FUROATE 1 MG/G
0.1 OINTMENT TOPICAL
Qty: 2 | Refills: 1 | Status: DISCONTINUED | COMMUNITY
Start: 2018-08-31 | End: 2019-11-12

## 2019-11-12 RX ORDER — BETAMETHASONE DIPROPIONATE 0.5 MG/G
0.05 OINTMENT, AUGMENTED TOPICAL
Qty: 1 | Refills: 2 | Status: DISCONTINUED | COMMUNITY
Start: 2018-08-31 | End: 2019-11-12

## 2019-11-12 RX ORDER — HYDROCORTISONE 25 MG/G
2.5 OINTMENT TOPICAL
Qty: 1 | Refills: 1 | Status: DISCONTINUED | COMMUNITY
Start: 2018-09-17 | End: 2019-11-12

## 2019-11-12 NOTE — PLAN
[FreeTextEntry1] : \par Patient is a 59 y.o Syrian speaking F w/ PMH HTN, morbid obesity, chronic CML (dx 2017, on imatinib)who presents for a follow up bp check. \par \par #Hypertension \par -/80, on stable dose of lisinopril 40 daily; renewed\par -f/u BMP to assess creatinine\par -Denies symptoms of CP, SOB, or blurry vision \par \par #CML, on imatinib\par -Followed by Oncology, Dr. Gross\par -reports imatinib induced vomiting, no weight loss or poor appetite \par -Has GI visit on 11/19 \par \par flu shot today\par RTC in 1 year for annual CPE or PRN\par D/W Dr. Berman

## 2019-11-12 NOTE — PHYSICAL EXAM
[Normal] : affect was normal and insight and judgment were intact [de-identified] : dark plaque like lesions on face, no erythema

## 2019-11-13 LAB
ANION GAP SERPL CALC-SCNC: 12 MMOL/L — SIGNIFICANT CHANGE UP (ref 5–17)
BUN SERPL-MCNC: 12 MG/DL — SIGNIFICANT CHANGE UP (ref 7–23)
CALCIUM SERPL-MCNC: 9.4 MG/DL — SIGNIFICANT CHANGE UP (ref 8.4–10.5)
CHLORIDE SERPL-SCNC: 105 MMOL/L — SIGNIFICANT CHANGE UP (ref 96–108)
CO2 SERPL-SCNC: 26 MMOL/L — SIGNIFICANT CHANGE UP (ref 22–31)
CREAT SERPL-MCNC: 0.82 MG/DL — SIGNIFICANT CHANGE UP (ref 0.5–1.3)
GLUCOSE SERPL-MCNC: 93 MG/DL — SIGNIFICANT CHANGE UP (ref 70–99)
POTASSIUM SERPL-MCNC: 4.1 MMOL/L — SIGNIFICANT CHANGE UP (ref 3.5–5.3)
POTASSIUM SERPL-SCNC: 4.1 MMOL/L — SIGNIFICANT CHANGE UP (ref 3.5–5.3)
SODIUM SERPL-SCNC: 142 MMOL/L — SIGNIFICANT CHANGE UP (ref 135–145)

## 2019-11-16 ENCOUNTER — OUTPATIENT (OUTPATIENT)
Dept: OUTPATIENT SERVICES | Facility: HOSPITAL | Age: 59
LOS: 1 days | Discharge: HOME | End: 2019-11-16

## 2019-11-16 DIAGNOSIS — C92.10 CHRONIC MYELOID LEUKEMIA, BCR/ABL-POSITIVE, NOT HAVING ACHIEVED REMISSION: ICD-10-CM

## 2019-11-16 DIAGNOSIS — Z98.891 HISTORY OF UTERINE SCAR FROM PREVIOUS SURGERY: Chronic | ICD-10-CM

## 2019-11-19 ENCOUNTER — OUTPATIENT (OUTPATIENT)
Dept: OUTPATIENT SERVICES | Facility: HOSPITAL | Age: 59
LOS: 1 days | End: 2019-11-19
Payer: SELF-PAY

## 2019-11-19 ENCOUNTER — APPOINTMENT (OUTPATIENT)
Dept: GASTROENTEROLOGY | Facility: HOSPITAL | Age: 59
End: 2019-11-19

## 2019-11-19 VITALS
DIASTOLIC BLOOD PRESSURE: 80 MMHG | WEIGHT: 176 LBS | RESPIRATION RATE: 14 BRPM | SYSTOLIC BLOOD PRESSURE: 120 MMHG | HEIGHT: 55 IN | HEART RATE: 76 BPM | BODY MASS INDEX: 40.73 KG/M2

## 2019-11-19 DIAGNOSIS — Z23 ENCOUNTER FOR IMMUNIZATION: ICD-10-CM

## 2019-11-19 DIAGNOSIS — C92.10 CHRONIC MYELOID LEUKEMIA, BCR/ABL-POSITIVE, NOT HAVING ACHIEVED REMISSION: ICD-10-CM

## 2019-11-19 DIAGNOSIS — E66.01 MORBID (SEVERE) OBESITY DUE TO EXCESS CALORIES: ICD-10-CM

## 2019-11-19 DIAGNOSIS — R11.2 NAUSEA WITH VOMITING, UNSPECIFIED: ICD-10-CM

## 2019-11-19 DIAGNOSIS — Z12.11 ENCOUNTER FOR SCREENING FOR MALIGNANT NEOPLASM OF COLON: ICD-10-CM

## 2019-11-19 DIAGNOSIS — R10.9 UNSPECIFIED ABDOMINAL PAIN: ICD-10-CM

## 2019-11-19 DIAGNOSIS — Z98.891 HISTORY OF UTERINE SCAR FROM PREVIOUS SURGERY: Chronic | ICD-10-CM

## 2019-11-19 DIAGNOSIS — Z80.0 FAMILY HISTORY OF MALIGNANT NEOPLASM OF DIGESTIVE ORGANS: ICD-10-CM

## 2019-11-19 LAB
ALBUMIN SERPL ELPH-MCNC: 4.7 G/DL — SIGNIFICANT CHANGE UP (ref 3.3–5)
ALP SERPL-CCNC: 89 U/L — SIGNIFICANT CHANGE UP (ref 40–120)
ALT FLD-CCNC: 16 U/L — SIGNIFICANT CHANGE UP (ref 10–45)
ANION GAP SERPL CALC-SCNC: 13 MMOL/L — SIGNIFICANT CHANGE UP (ref 5–17)
AST SERPL-CCNC: 20 U/L — SIGNIFICANT CHANGE UP (ref 10–40)
BASOPHILS # BLD AUTO: 0.04 K/UL — SIGNIFICANT CHANGE UP (ref 0–0.2)
BASOPHILS NFR BLD AUTO: 0.6 % — SIGNIFICANT CHANGE UP (ref 0–2)
BILIRUB SERPL-MCNC: 0.3 MG/DL — SIGNIFICANT CHANGE UP (ref 0.2–1.2)
BUN SERPL-MCNC: 8 MG/DL — SIGNIFICANT CHANGE UP (ref 7–23)
CALCIUM SERPL-MCNC: 9.4 MG/DL — SIGNIFICANT CHANGE UP (ref 8.4–10.5)
CHLORIDE SERPL-SCNC: 102 MMOL/L — SIGNIFICANT CHANGE UP (ref 96–108)
CO2 SERPL-SCNC: 26 MMOL/L — SIGNIFICANT CHANGE UP (ref 22–31)
CREAT SERPL-MCNC: 0.76 MG/DL — SIGNIFICANT CHANGE UP (ref 0.5–1.3)
EOSINOPHIL # BLD AUTO: 0.21 K/UL — SIGNIFICANT CHANGE UP (ref 0–0.5)
EOSINOPHIL NFR BLD AUTO: 3.1 % — SIGNIFICANT CHANGE UP (ref 0–6)
GLUCOSE SERPL-MCNC: 101 MG/DL — HIGH (ref 70–99)
HCT VFR BLD CALC: 37.4 % — SIGNIFICANT CHANGE UP (ref 34.5–45)
HGB BLD-MCNC: 11.9 G/DL — SIGNIFICANT CHANGE UP (ref 11.5–15.5)
IMM GRANULOCYTES NFR BLD AUTO: 0.4 % — SIGNIFICANT CHANGE UP (ref 0–1.5)
INR BLD: 0.99 RATIO — SIGNIFICANT CHANGE UP (ref 0.88–1.16)
LYMPHOCYTES # BLD AUTO: 2.33 K/UL — SIGNIFICANT CHANGE UP (ref 1–3.3)
LYMPHOCYTES # BLD AUTO: 34 % — SIGNIFICANT CHANGE UP (ref 13–44)
MCHC RBC-ENTMCNC: 30.4 PG — SIGNIFICANT CHANGE UP (ref 27–34)
MCHC RBC-ENTMCNC: 31.8 GM/DL — LOW (ref 32–36)
MCV RBC AUTO: 95.7 FL — SIGNIFICANT CHANGE UP (ref 80–100)
MONOCYTES # BLD AUTO: 0.48 K/UL — SIGNIFICANT CHANGE UP (ref 0–0.9)
MONOCYTES NFR BLD AUTO: 7 % — SIGNIFICANT CHANGE UP (ref 2–14)
NEUTROPHILS # BLD AUTO: 3.77 K/UL — SIGNIFICANT CHANGE UP (ref 1.8–7.4)
NEUTROPHILS NFR BLD AUTO: 54.9 % — SIGNIFICANT CHANGE UP (ref 43–77)
PLATELET # BLD AUTO: 232 K/UL — SIGNIFICANT CHANGE UP (ref 150–400)
POTASSIUM SERPL-MCNC: 3.9 MMOL/L — SIGNIFICANT CHANGE UP (ref 3.5–5.3)
POTASSIUM SERPL-SCNC: 3.9 MMOL/L — SIGNIFICANT CHANGE UP (ref 3.5–5.3)
PROT SERPL-MCNC: 7.4 G/DL — SIGNIFICANT CHANGE UP (ref 6–8.3)
PROTHROM AB SERPL-ACNC: 11.4 SEC — SIGNIFICANT CHANGE UP (ref 10–13.1)
RBC # BLD: 3.91 M/UL — SIGNIFICANT CHANGE UP (ref 3.8–5.2)
RBC # FLD: 14.6 % — HIGH (ref 10.3–14.5)
SODIUM SERPL-SCNC: 141 MMOL/L — SIGNIFICANT CHANGE UP (ref 135–145)
WBC # BLD: 6.86 K/UL — SIGNIFICANT CHANGE UP (ref 3.8–10.5)
WBC # FLD AUTO: 6.86 K/UL — SIGNIFICANT CHANGE UP (ref 3.8–10.5)

## 2019-11-19 PROCEDURE — 80053 COMPREHEN METABOLIC PANEL: CPT

## 2019-11-19 PROCEDURE — 36415 COLL VENOUS BLD VENIPUNCTURE: CPT

## 2019-11-19 PROCEDURE — G0463: CPT

## 2019-11-19 PROCEDURE — 85610 PROTHROMBIN TIME: CPT

## 2019-11-19 RX ORDER — POLYETHYLENE GLYCOL 3350 AND ELECTROLYTES WITH LEMON FLAVOR 236; 22.74; 6.74; 5.86; 2.97 G/4L; G/4L; G/4L; G/4L; G/4L
236 POWDER, FOR SOLUTION ORAL
Qty: 1 | Refills: 0 | Status: ACTIVE | COMMUNITY
Start: 2019-11-19 | End: 1900-01-01

## 2019-11-19 RX ORDER — BISACODYL 5 MG/1
5 TABLET ORAL
Qty: 4 | Refills: 0 | Status: ACTIVE | COMMUNITY
Start: 2019-11-19 | End: 1900-01-01

## 2019-11-19 NOTE — PHYSICAL EXAM
[General Appearance - In No Acute Distress] : in no acute distress [General Appearance - Alert] : alert [Examination Of The Oral Cavity] : the lips and gums were normal [PERRL With Normal Accommodation] : pupils were equal in size, round, and reactive to light [Sclera] : the sclera and conjunctiva were normal [Oropharynx] : the oropharynx was normal [Neck Appearance] : the appearance of the neck was normal [Exaggerated Use Of Accessory Muscles For Inspiration] : no accessory muscle use [Heart Rate And Rhythm] : heart rate was normal and rhythm regular [Apical Impulse] : the apical impulse was normal [Abdomen Soft] : soft [Abdomen Tenderness] : non-tender [Bowel Sounds] : normal bowel sounds [No CVA Tenderness] : no ~M costovertebral angle tenderness [Abnormal Walk] : normal gait [Musculoskeletal - Swelling] : no joint swelling seen [] : no rash [Skin Lesions] : no skin lesions [Sensation] : the sensory exam was normal to light touch and pinprick [Motor Exam] : the motor exam was normal [Affect] : the affect was normal [Oriented To Time, Place, And Person] : oriented to person, place, and time [FreeTextEntry1] : Obese

## 2019-11-19 NOTE — REVIEW OF SYSTEMS
[Vomiting] : vomiting [Fever] : no fever [Chills] : no chills [Eye Pain] : no eye pain [Red Eyes] : eyes not red [Chest Pain] : no chest pain [Sore Throat] : no sore throat [Hoarseness] : no hoarseness [Palpitations] : no palpitations [Shortness Of Breath] : no shortness of breath [Abdominal Pain] : no abdominal pain [Cough] : no cough [Constipation] : no constipation [Diarrhea] : no diarrhea [Heartburn] : no heartburn [Dysuria] : no dysuria [Melena] : no melena [Arthralgias] : no arthralgias [Incontinence] : no incontinence [Joint Pain] : no joint pain [Skin Lesions] : no skin lesions [Skin Wound] : no skin wound [Dizziness] : no dizziness [Fainting] : no fainting [Anxiety] : no anxiety [Depression] : no depression

## 2019-11-19 NOTE — HISTORY OF PRESENT ILLNESS
[Diarrhea] : denies diarrhea [Heartburn] : denies heartburn [Yellow Skin Or Eyes (Jaundice)] : denies jaundice [Constipation] : denies constipation [Abdominal Swelling] : denies abdominal swelling [Abdominal Pain] : denies abdominal pain [Rectal Pain] : denies rectal pain [Nausea] : nausea [Vomiting] : vomiting [Wt Loss ___ Lbs] : no recent weight loss [de-identified] : N/A [de-identified] : N/A [de-identified] : N/A [de-identified] : 60 y/o Nepalese-speaking F w/ hx of CML on imatinib, HTN, and morbid obesity, referred from primary care clinic for evaluation of nausea/vomiting.\par \par The patient's daughter translated for her. She declined telephone  services.\par \par The patient endorses episodes of nausea/vomiting occurring every 2 weeks or so since she started taking Imatinib. Sometimes she will have episodes of vomiting multiple times per week, however, generally she has a few days of nausea/vomiting, her symptoms resolve on their own, and then she feels better for several days. She states her emesis is non-bloody and non-bilious. She otherwise denies abdominal pain, bloody stools, and black tarry stools, diarrhea, and constipation. She has a soft, formed, brown bowel movement every day to every other day.\par \par The patient has never had an upper endoscopy or colonoscopy.

## 2019-11-19 NOTE — ASSESSMENT
[FreeTextEntry1] : Impression:\par \par # Nausea/vomiting: Differential includes medication side effect, H. pylori gastritis, esophagitis, GERD, esophageal stricture or mass, gastric or duodenal mass or malignancy, and gastroparesis.\par # Morbid obesity: BMI 40\par # Colorectal cancer screening: Average risk. No alarm symptoms.\par \par Plan: \par - Check CBC, CMP, and PT/INR\par - Plan for EGD/Colonoscopy; patient was informed of risk of procedure including bleeding, infection, perforation, missed lesions, and anesthesia.\par - GoLYTELY and bisacodyl for bowel prep\par - Patient counseled on diet and exercise for weight loss\par - RTC after EGD/Colonoscopy\par

## 2019-11-21 ENCOUNTER — OUTPATIENT (OUTPATIENT)
Dept: OUTPATIENT SERVICES | Facility: HOSPITAL | Age: 59
LOS: 1 days | End: 2019-11-21
Payer: SELF-PAY

## 2019-11-21 ENCOUNTER — RESULT REVIEW (OUTPATIENT)
Age: 59
End: 2019-11-21

## 2019-11-21 DIAGNOSIS — Z12.11 ENCOUNTER FOR SCREENING FOR MALIGNANT NEOPLASM OF COLON: ICD-10-CM

## 2019-11-21 DIAGNOSIS — Z98.891 HISTORY OF UTERINE SCAR FROM PREVIOUS SURGERY: Chronic | ICD-10-CM

## 2019-11-21 PROCEDURE — 43239 EGD BIOPSY SINGLE/MULTIPLE: CPT | Mod: GC

## 2019-11-21 PROCEDURE — 45378 DIAGNOSTIC COLONOSCOPY: CPT | Mod: GC

## 2019-11-21 PROCEDURE — 43239 EGD BIOPSY SINGLE/MULTIPLE: CPT

## 2019-11-21 PROCEDURE — 88312 SPECIAL STAINS GROUP 1: CPT

## 2019-11-21 PROCEDURE — 88312 SPECIAL STAINS GROUP 1: CPT | Mod: 26

## 2019-11-21 PROCEDURE — G0121: CPT

## 2019-11-21 PROCEDURE — 88305 TISSUE EXAM BY PATHOLOGIST: CPT | Mod: 26

## 2019-11-21 PROCEDURE — 88305 TISSUE EXAM BY PATHOLOGIST: CPT

## 2019-11-22 ENCOUNTER — APPOINTMENT (OUTPATIENT)
Dept: HEMATOLOGY ONCOLOGY | Facility: CLINIC | Age: 59
End: 2019-11-22
Payer: MEDICAID

## 2019-11-22 ENCOUNTER — RESULT REVIEW (OUTPATIENT)
Age: 59
End: 2019-11-22

## 2019-11-22 ENCOUNTER — OUTPATIENT (OUTPATIENT)
Dept: OUTPATIENT SERVICES | Facility: HOSPITAL | Age: 59
LOS: 1 days | End: 2019-11-22
Payer: MEDICAID

## 2019-11-22 VITALS
WEIGHT: 175.02 LBS | TEMPERATURE: 97.2 F | DIASTOLIC BLOOD PRESSURE: 64 MMHG | OXYGEN SATURATION: 99 % | RESPIRATION RATE: 16 BRPM | HEART RATE: 67 BPM | BODY MASS INDEX: 40.68 KG/M2 | SYSTOLIC BLOOD PRESSURE: 97 MMHG

## 2019-11-22 DIAGNOSIS — L81.9 DISORDER OF PIGMENTATION, UNSPECIFIED: ICD-10-CM

## 2019-11-22 DIAGNOSIS — E66.01 MORBID (SEVERE) OBESITY DUE TO EXCESS CALORIES: ICD-10-CM

## 2019-11-22 DIAGNOSIS — Z98.891 HISTORY OF UTERINE SCAR FROM PREVIOUS SURGERY: Chronic | ICD-10-CM

## 2019-11-22 DIAGNOSIS — R11.2 NAUSEA WITH VOMITING, UNSPECIFIED: ICD-10-CM

## 2019-11-22 DIAGNOSIS — C92.10 CHRONIC MYELOID LEUKEMIA, BCR/ABL-POSITIVE, NOT HAVING ACHIEVED REMISSION: ICD-10-CM

## 2019-11-22 LAB
BASOPHILS # BLD AUTO: 0 K/UL — SIGNIFICANT CHANGE UP (ref 0–0.2)
BASOPHILS NFR BLD AUTO: 0.2 % — SIGNIFICANT CHANGE UP (ref 0–2)
EOSINOPHIL # BLD AUTO: 0.2 K/UL — SIGNIFICANT CHANGE UP (ref 0–0.5)
EOSINOPHIL NFR BLD AUTO: 4 % — SIGNIFICANT CHANGE UP (ref 0–6)
HCT VFR BLD CALC: 33.7 % — LOW (ref 34.5–45)
HGB BLD-MCNC: 11.6 G/DL — SIGNIFICANT CHANGE UP (ref 11.5–15.5)
LYMPHOCYTES # BLD AUTO: 1.8 K/UL — SIGNIFICANT CHANGE UP (ref 1–3.3)
LYMPHOCYTES # BLD AUTO: 39.2 % — SIGNIFICANT CHANGE UP (ref 13–44)
MCHC RBC-ENTMCNC: 31.9 PG — SIGNIFICANT CHANGE UP (ref 27–34)
MCHC RBC-ENTMCNC: 34.3 G/DL — SIGNIFICANT CHANGE UP (ref 32–36)
MCV RBC AUTO: 93 FL — SIGNIFICANT CHANGE UP (ref 80–100)
MONOCYTES # BLD AUTO: 0.3 K/UL — SIGNIFICANT CHANGE UP (ref 0–0.9)
MONOCYTES NFR BLD AUTO: 7.1 % — SIGNIFICANT CHANGE UP (ref 2–14)
NEUTROPHILS # BLD AUTO: 2.3 K/UL — SIGNIFICANT CHANGE UP (ref 1.8–7.4)
NEUTROPHILS NFR BLD AUTO: 49.4 % — SIGNIFICANT CHANGE UP (ref 43–77)
PLATELET # BLD AUTO: 173 K/UL — SIGNIFICANT CHANGE UP (ref 150–400)
RBC # BLD: 3.62 M/UL — LOW (ref 3.8–5.2)
RBC # FLD: 12.9 % — SIGNIFICANT CHANGE UP (ref 10.3–14.5)
WBC # BLD: 4.7 K/UL — SIGNIFICANT CHANGE UP (ref 3.8–10.5)
WBC # FLD AUTO: 4.7 K/UL — SIGNIFICANT CHANGE UP (ref 3.8–10.5)

## 2019-11-22 PROCEDURE — 88291 CYTO/MOLECULAR REPORT: CPT | Mod: 59

## 2019-11-22 PROCEDURE — 88271 CYTOGENETICS DNA PROBE: CPT

## 2019-11-22 PROCEDURE — G0452: CPT | Mod: 26

## 2019-11-22 PROCEDURE — 81206 BCR/ABL1 GENE MAJOR BP: CPT

## 2019-11-22 PROCEDURE — 99213 OFFICE O/P EST LOW 20 MIN: CPT

## 2019-11-22 PROCEDURE — 88237 TISSUE CULTURE BONE MARROW: CPT

## 2019-11-22 PROCEDURE — 88275 CYTOGENETICS 100-300: CPT

## 2019-11-22 NOTE — REASON FOR VISIT
[Follow-Up Visit] : a follow-up visit for [Chronic Leukemia] : chronic leukemia [[employer] ] : provided by [employer]   [FreeTextEntry1] : [id number] [FreeTextEntry2] : Mayur [TWNoteComboBox1] : [place]

## 2019-11-22 NOTE — REVIEW OF SYSTEMS
[Skin Rash] : skin rash [Anxiety] : anxiety [Negative] : Allergic/Immunologic [Fever] : no fever [Fatigue] : no fatigue [Recent Change In Weight] : ~T no recent weight change [Vision Problems] : no vision problems [Dysphagia] : no dysphagia [Chest Pain] : no chest pain [Lower Ext Edema] : no lower extremity edema [Cough] : no cough [Abdominal Pain] : no abdominal pain [Constipation] : no constipation [Diarrhea] : no diarrhea [Joint Stiffness] : no joint stiffness [Muscle Pain] : no muscle pain [Difficulty Walking] : no difficulty walking [Easy Bleeding] : no tendency for easy bleeding [Easy Bruising] : no tendency for easy bruising [Swollen Glands] : no swollen glands [de-identified] : diffuse rash.

## 2019-11-22 NOTE — ASSESSMENT
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 \par \par 1. CML, chronic phase: asymptomatic at time of diagnosis September 2017 (WBC 83, ). bcr-abl >10% with negative JAK2. Bone marrow biopsy (10/16/2017) with minimal specimen but enough to confirm CML. Imatinib 400mg daily was initiated. Her quant bcr-abl has been followed at 3 month intervals along with her CBC. \par - has had complete hematologic response since initiation of imatinib. \par -bcr-abl initially improving until imatinib was held for evaluation of her eventually diagnosed lichen planus. Once the imatinib was restarted her bcr-abl started to downtrend again though has remained detectable (<1%).\par BCR-ABL done today. \par \par 2. 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 \par -There are several case reports in the literature of lichen planus associated with imatinib. Studies have shown skin toxicity in up 17% (all grade) at the 400mg dose. \par -Rash is steadily improving with each visit and does not interfere with her daily activities. she reports no longer taking most of the prescribed but OTC creams as needed.\par \par RTC 4 months.

## 2019-11-22 NOTE — HISTORY OF PRESENT ILLNESS
[Disease:__________________________] : Disease: [unfilled] [Therapy: ___] : Therapy: [unfilled] [___________________________________] : Drug: [unfilled] [0 - No Distress] : Distress Level: 0 [de-identified] : 60yo F originally from [facility] with a history of HTN who was admitted to [facility] after her WBC on outpatient labs was >80k. She was asymptomatic at the time. Her flow cytometry had 1.2% myeloblasts (HLA-DR , CD 33, CD 34,  positive). BCR-ABL was positive (>10%) and JAK2 was negative. The patient had CT scans of chest/abdomen/pelvis showed splenomegaly (14 cm) and an aortocaval node  measuring 1.9 x 1.4 cm. Bone marrow biopsy done 10/16. Imatinib 400 mg daily initiated. She is intermediate-high risk based on Sokal and Hasford risk calculations. Treatment course was complicated by imatinib-induced lichen planus (biopsy proven) which is controlled with topical medications while continuing on the imatinib. [de-identified] : intermediate risk  [FreeTextEntry1] : imatinib  [de-identified] :  The rash on her arms and face continues to improve. Patient had an EGD and colonoscopy done 11/21/19, results. Patient is on Imatinib 400 mg daily, tolerated well. \par \par No other changes in her medical, surgical or social history since 8/22/19.  [de-identified] : lichen planus (biopsy proven, 8/31/18) [de-identified] : Dermatologic: [TWNoteComboBox3] : Grade: 1

## 2019-11-22 NOTE — PHYSICAL EXAM
[Restricted in physically strenuous activity but ambulatory and able to carry out work of a light or sedentary nature] : Status 1- Restricted in physically strenuous activity but ambulatory and able to carry out work of a light or sedentary nature, e.g., light house work, office work [Obese] : obese [Normal] : normal spine exam without palpable tenderness, no kyphosis or scoliosis [Thrush] : no thrush [de-identified] : diffuse hyperpigmentation of the skin in the face, arms,  legs,  abdomen, buttocks, back. Some of the patches are lighter than others specially in the back.

## 2019-11-25 LAB
ALBUMIN SERPL ELPH-MCNC: 4.6 G/DL
ALP BLD-CCNC: 80 U/L
ALT SERPL-CCNC: 15 U/L
ANION GAP SERPL CALC-SCNC: 13 MMOL/L
AST SERPL-CCNC: 18 U/L
BILIRUB SERPL-MCNC: 0.4 MG/DL
BUN SERPL-MCNC: 12 MG/DL
CALCIUM SERPL-MCNC: 9.4 MG/DL
CHLORIDE SERPL-SCNC: 104 MMOL/L
CO2 SERPL-SCNC: 26 MMOL/L
CREAT SERPL-MCNC: 0.8 MG/DL
GLUCOSE SERPL-MCNC: 92 MG/DL
LDH SERPL-CCNC: 217 U/L
POTASSIUM SERPL-SCNC: 3.8 MMOL/L
PROT SERPL-MCNC: 7.1 G/DL
SODIUM SERPL-SCNC: 143 MMOL/L
SURGICAL PATHOLOGY STUDY: SIGNIFICANT CHANGE UP

## 2019-11-26 LAB — CHROM ANALY INTERPHASE BLD FISH-IMP: SIGNIFICANT CHANGE UP

## 2019-11-27 LAB — BCR/ABL BY RT - PCR QUANTITATIVE: SIGNIFICANT CHANGE UP

## 2019-11-29 RX ORDER — METRONIDAZOLE 500 MG/1
500 TABLET ORAL 4 TIMES DAILY
Qty: 56 | Refills: 0 | Status: ACTIVE | COMMUNITY
Start: 2019-11-29 | End: 1900-01-01

## 2019-11-29 RX ORDER — TETRACYCLINE HYDROCHLORIDE 500 MG/1
500 CAPSULE ORAL 4 TIMES DAILY
Qty: 56 | Refills: 0 | Status: ACTIVE | COMMUNITY
Start: 2019-11-29 | End: 1900-01-01

## 2019-11-29 RX ORDER — BISMUTH SUBSALICYLATE 262 MG
262 TABLET ORAL 4 TIMES DAILY
Qty: 56 | Refills: 0 | Status: ACTIVE | COMMUNITY
Start: 2019-11-29 | End: 1900-01-01

## 2020-04-06 ENCOUNTER — OUTPATIENT (OUTPATIENT)
Dept: OUTPATIENT SERVICES | Facility: HOSPITAL | Age: 60
LOS: 1 days | Discharge: HOME | End: 2020-04-06

## 2020-04-06 DIAGNOSIS — Z98.891 HISTORY OF UTERINE SCAR FROM PREVIOUS SURGERY: Chronic | ICD-10-CM

## 2020-04-06 DIAGNOSIS — C92.10 CHRONIC MYELOID LEUKEMIA, BCR/ABL-POSITIVE, NOT HAVING ACHIEVED REMISSION: ICD-10-CM

## 2020-04-13 ENCOUNTER — APPOINTMENT (OUTPATIENT)
Dept: HEMATOLOGY ONCOLOGY | Facility: CLINIC | Age: 60
End: 2020-04-13
Payer: SELF-PAY

## 2020-04-13 ENCOUNTER — APPOINTMENT (OUTPATIENT)
Dept: HEMATOLOGY ONCOLOGY | Facility: CLINIC | Age: 60
End: 2020-04-13

## 2020-04-13 PROCEDURE — 99204 OFFICE O/P NEW MOD 45 MIN: CPT | Mod: 95

## 2020-04-13 NOTE — ASSESSMENT
[FreeTextEntry1] : 60yo F with a history of HTN with chronic phase CML on Imatinib. \par \par 1. CML, chronic phase: asymptomatic at time of diagnosis September 2017 (WBC 83, ). bcr-abl >10% with negative JAK2. Bone marrow biopsy (10/16/2017) with minimal specimen but enough to confirm CML. Imatinib 400mg daily was initiated. Her quant bcr-abl has been followed at 3 month intervals along with her CBC. \par - has had complete hematologic response since initiation of imatinib. \par -bcr-abl initially improving until imatinib was held for evaluation of her eventually diagnosed lichen planus. Once the imatinib was restarted her bcr-abl started to downtrend again though has remained detectable (<1%).\par BCR-ABL will be ordered. \par \par 2. 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 \par -There are several case reports in the literature of lichen planus associated with imatinib. Studies have shown skin toxicity in up 17% (all grade) at the 400mg dose. \par -Rash is steadily improving with each visit and does not interfere with her daily activities. she reports no longer taking most of the prescribed but OTC creams as needed.\par \par This service was provided by using telehealth. The patient was at home and I was at [facility]. The patient requested and participated in this encounter. The encounter face to face last 30   minutes coordinating his/her care and counseling.\par \par \par RTC 4 months.

## 2020-04-13 NOTE — HISTORY OF PRESENT ILLNESS
[Disease:__________________________] : Disease: [unfilled] [Therapy: ___] : Therapy: [unfilled] [___________________________________] : Drug: [unfilled] [0 - No Distress] : Distress Level: 0 [Home] : at home, [unfilled] , at the time of the visit. [Medical Office: ([facility])___] : at ~his/her~ medical office located in [place] [Family Member] : family member [Patient] : the patient [Self] : self [FreeTextEntry2] : Rio Maldonado [FreeTextEntry4] : relative [de-identified] : 58yo F originally from [facility] with a history of HTN who was admitted to [facility] after her WBC on outpatient labs was >80k. She was asymptomatic at the time. Her flow cytometry had 1.2% myeloblasts (HLA-DR , CD 33, CD 34,  positive). BCR-ABL was positive (>10%) and JAK2 was negative. The patient had CT scans of chest/abdomen/pelvis showed splenomegaly (14 cm) and an aortocaval node  measuring 1.9 x 1.4 cm. Bone marrow biopsy done 10/16. Imatinib 400 mg daily initiated. She is intermediate-high risk based on Sokal and Hasford risk calculations. Treatment course was complicated by imatinib-induced lichen planus (biopsy proven) which is controlled with topical medications while continuing on the imatinib. [de-identified] : intermediate risk  [FreeTextEntry1] : imatinib  [de-identified] :  The rash on her arms and face continues to improve. Patient had an EGD and colonoscopy done 11/21/19, results. Patient is on Imatinib 400 mg daily, tolerated well. \par \par No other changes in her medical, surgical or social history since 11/22/19.  [de-identified] : lichen planus (biopsy proven, 8/31/18) [de-identified] : Dermatologic: [TWNoteComboBox3] : Grade: 1

## 2020-04-13 NOTE — REASON FOR VISIT
[Follow-Up Visit] : a follow-up visit for [Chronic Leukemia] : chronic leukemia [[employer] ] : provided by [employer]   [FreeTextEntry1] : [id number] [FreeTextEntry2] : Mayur [TWNoteComboBox1] : [place]ian

## 2020-04-13 NOTE — PHYSICAL EXAM
[Restricted in physically strenuous activity but ambulatory and able to carry out work of a light or sedentary nature] : Status 1- Restricted in physically strenuous activity but ambulatory and able to carry out work of a light or sedentary nature, e.g., light house work, office work [Obese] : obese [Normal] : affect appropriate [Thrush] : no thrush [de-identified] : diffuse hyperpigmentation of the skin in the face, arms,  legs,  abdomen, buttocks, back. Some of the patches are lighter than others specially in the back.

## 2020-04-13 NOTE — REVIEW OF SYSTEMS
[Skin Rash] : skin rash [Anxiety] : anxiety [Negative] : Allergic/Immunologic [Fever] : no fever [Fatigue] : no fatigue [Recent Change In Weight] : ~T no recent weight change [Vision Problems] : no vision problems [Dysphagia] : no dysphagia [Chest Pain] : no chest pain [Lower Ext Edema] : no lower extremity edema [Cough] : no cough [Abdominal Pain] : no abdominal pain [Constipation] : no constipation [Diarrhea] : no diarrhea [Joint Stiffness] : no joint stiffness [Muscle Pain] : no muscle pain [Difficulty Walking] : no difficulty walking [Easy Bleeding] : no tendency for easy bleeding [Easy Bruising] : no tendency for easy bruising [Swollen Glands] : no swollen glands [de-identified] : diffuse rash.

## 2020-08-07 ENCOUNTER — OUTPATIENT (OUTPATIENT)
Dept: OUTPATIENT SERVICES | Facility: HOSPITAL | Age: 60
LOS: 1 days | Discharge: HOME | End: 2020-08-07

## 2020-08-07 DIAGNOSIS — Z98.891 HISTORY OF UTERINE SCAR FROM PREVIOUS SURGERY: Chronic | ICD-10-CM

## 2020-08-07 DIAGNOSIS — C92.10 CHRONIC MYELOID LEUKEMIA, BCR/ABL-POSITIVE, NOT HAVING ACHIEVED REMISSION: ICD-10-CM

## 2020-08-10 ENCOUNTER — APPOINTMENT (OUTPATIENT)
Dept: HEMATOLOGY ONCOLOGY | Facility: CLINIC | Age: 60
End: 2020-08-10
Payer: COMMERCIAL

## 2020-08-10 DIAGNOSIS — Z86.59 PERSONAL HISTORY OF OTHER MENTAL AND BEHAVIORAL DISORDERS: ICD-10-CM

## 2020-08-10 PROCEDURE — 99214 OFFICE O/P EST MOD 30 MIN: CPT | Mod: 95

## 2020-08-10 NOTE — REVIEW OF SYSTEMS
[Skin Rash] : skin rash [Anxiety] : anxiety [Negative] : Allergic/Immunologic [Fever] : no fever [Vision Problems] : no vision problems [Recent Change In Weight] : ~T no recent weight change [Fatigue] : no fatigue [Dysphagia] : no dysphagia [Chest Pain] : no chest pain [Cough] : no cough [Abdominal Pain] : no abdominal pain [Lower Ext Edema] : no lower extremity edema [Diarrhea] : no diarrhea [Constipation] : no constipation [Joint Stiffness] : no joint stiffness [Easy Bleeding] : no tendency for easy bleeding [Muscle Pain] : no muscle pain [Easy Bruising] : no tendency for easy bruising [Difficulty Walking] : no difficulty walking [Swollen Glands] : no swollen glands [de-identified] : diffuse rash.

## 2020-08-10 NOTE — ASSESSMENT
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 \par \par 1. CML, chronic phase: asymptomatic at time of diagnosis September 2017 (WBC 83, ). bcr-abl >10% with negative JAK2. Bone marrow biopsy (10/16/2017) with minimal specimen but enough to confirm CML. Imatinib 400mg daily was initiated. Her quant bcr-abl has been followed at 3 month intervals along with her CBC. \par - has had complete hematologic response since initiation of imatinib. \par -bcr-abl initially improving until imatinib was held for evaluation of her eventually diagnosed lichen planus. Once the imatinib was restarted her bcr-abl started to downtrend again though has remained detectable (<1%).\par BCR-ABL will be ordered. \par \par 2. 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 \par -There are several case reports in the literature of lichen planus associated with imatinib. Studies have shown skin toxicity in up 17% (all grade) at the 400mg dose. \par -Rash is steadily improving with each visit and does not interfere with her daily activities. she reports no longer taking most of the prescribed but OTC creams as needed.\par \par This service was provided by using telehealth. The patient was at home and I was at [facility]. The patient requested and participated in this encounter. The encounter face to face last 30   minutes coordinating his/her care and counseling.\par \par \par RTC 2 months.

## 2020-08-10 NOTE — HISTORY OF PRESENT ILLNESS
[Disease:__________________________] : Disease: [unfilled] [Therapy: ___] : Therapy: [unfilled] [___________________________________] : Drug: [unfilled] [0 - No Distress] : Distress Level: 0 [Home] : at home, [unfilled] , at the time of the visit. [Medical Office: ([facility])___] : at ~his/her~ medical office located in [place] [Family Member] : family member [Patient] : the patient [Self] : self [de-identified] : 58yo F originally from [facility] with a history of HTN who was admitted to [facility] after her WBC on outpatient labs was >80k. She was asymptomatic at the time. Her flow cytometry had 1.2% myeloblasts (HLA-DR , CD 33, CD 34,  positive). BCR-ABL was positive (>10%) and JAK2 was negative. The patient had CT scans of chest/abdomen/pelvis showed splenomegaly (14 cm) and an aortocaval node  measuring 1.9 x 1.4 cm. Bone marrow biopsy done 10/16. Imatinib 400 mg daily initiated. She is intermediate-high risk based on Sokal and Hasford risk calculations. Treatment course was complicated by imatinib-induced lichen planus (biopsy proven) which is controlled with topical medications while continuing on the imatinib. [de-identified] : intermediate risk  [FreeTextEntry1] : imatinib  [de-identified] : lichen planus (biopsy proven, 8/31/18) [de-identified] :  The rash on her arms and face continues to improve. Patient is on Imatinib 400 mg daily, tolerated well. \par \par No other changes in her medical, surgical or social history since 4/23/2020.  [de-identified] : Dermatologic: [TWNoteComboBox3] : Grade: 1 [FreeTextEntry2] : Rio Maldonado [FreeTextEntry4] : relative

## 2020-08-10 NOTE — PHYSICAL EXAM
[Restricted in physically strenuous activity but ambulatory and able to carry out work of a light or sedentary nature] : Status 1- Restricted in physically strenuous activity but ambulatory and able to carry out work of a light or sedentary nature, e.g., light house work, office work [Obese] : obese [Normal] : affect appropriate [Thrush] : no thrush [de-identified] : diffuse hyperpigmentation of the skin in the face, arms,  legs,  abdomen, buttocks, back. Some of the patches are lighter than others specially in the back.

## 2020-08-10 NOTE — REASON FOR VISIT
[Chronic Leukemia] : chronic leukemia [Follow-Up Visit] : a follow-up visit for [[employer] ] : provided by [employer]   [FreeTextEntry1] : [id number] [FreeTextEntry2] : Mayur [TWNoteComboBox1] : [place] Leonean

## 2020-08-12 NOTE — ED PROVIDER NOTE - SKIN, MLM
Let's get her scheduled ASAP for bilateral ONB with myself please.  
Skin normal color for race, warm, dry and intact. No evidence of rash.

## 2020-08-14 ENCOUNTER — OUTPATIENT (OUTPATIENT)
Dept: OUTPATIENT SERVICES | Facility: HOSPITAL | Age: 60
LOS: 1 days | Discharge: HOME | End: 2020-08-14

## 2020-08-14 ENCOUNTER — OUTPATIENT (OUTPATIENT)
Dept: OUTPATIENT SERVICES | Facility: HOSPITAL | Age: 60
LOS: 1 days | End: 2020-08-14
Payer: COMMERCIAL

## 2020-08-14 ENCOUNTER — RESULT REVIEW (OUTPATIENT)
Age: 60
End: 2020-08-14

## 2020-08-14 ENCOUNTER — APPOINTMENT (OUTPATIENT)
Dept: HEMATOLOGY ONCOLOGY | Facility: CLINIC | Age: 60
End: 2020-08-14

## 2020-08-14 DIAGNOSIS — C92.10 CHRONIC MYELOID LEUKEMIA, BCR/ABL-POSITIVE, NOT HAVING ACHIEVED REMISSION: ICD-10-CM

## 2020-08-14 DIAGNOSIS — Z98.891 HISTORY OF UTERINE SCAR FROM PREVIOUS SURGERY: Chronic | ICD-10-CM

## 2020-08-14 LAB
BASOPHILS # BLD AUTO: 0.04 K/UL — SIGNIFICANT CHANGE UP (ref 0–0.2)
BASOPHILS NFR BLD AUTO: 0.8 % — SIGNIFICANT CHANGE UP (ref 0–2)
EOSINOPHIL # BLD AUTO: 0.45 K/UL — SIGNIFICANT CHANGE UP (ref 0–0.5)
EOSINOPHIL NFR BLD AUTO: 8.5 % — HIGH (ref 0–6)
HCT VFR BLD CALC: 34.7 % — SIGNIFICANT CHANGE UP (ref 34.5–45)
HGB BLD-MCNC: 11.5 G/DL — SIGNIFICANT CHANGE UP (ref 11.5–15.5)
IMM GRANULOCYTES NFR BLD AUTO: 0.6 % — SIGNIFICANT CHANGE UP (ref 0–1.5)
LYMPHOCYTES # BLD AUTO: 1.54 K/UL — SIGNIFICANT CHANGE UP (ref 1–3.3)
LYMPHOCYTES # BLD AUTO: 29.1 % — SIGNIFICANT CHANGE UP (ref 13–44)
MCHC RBC-ENTMCNC: 31.1 PG — SIGNIFICANT CHANGE UP (ref 27–34)
MCHC RBC-ENTMCNC: 33.1 GM/DL — SIGNIFICANT CHANGE UP (ref 32–36)
MCV RBC AUTO: 93.8 FL — SIGNIFICANT CHANGE UP (ref 80–100)
MONOCYTES # BLD AUTO: 0.39 K/UL — SIGNIFICANT CHANGE UP (ref 0–0.9)
MONOCYTES NFR BLD AUTO: 7.4 % — SIGNIFICANT CHANGE UP (ref 2–14)
NEUTROPHILS # BLD AUTO: 2.84 K/UL — SIGNIFICANT CHANGE UP (ref 1.8–7.4)
NEUTROPHILS NFR BLD AUTO: 53.6 % — SIGNIFICANT CHANGE UP (ref 43–77)
NRBC # BLD: 0 /100 WBCS — SIGNIFICANT CHANGE UP (ref 0–0)
PLATELET # BLD AUTO: 201 K/UL — SIGNIFICANT CHANGE UP (ref 150–400)
RBC # BLD: 3.7 M/UL — LOW (ref 3.8–5.2)
RBC # FLD: 13.9 % — SIGNIFICANT CHANGE UP (ref 10.3–14.5)
WBC # BLD: 5.29 K/UL — SIGNIFICANT CHANGE UP (ref 3.8–10.5)
WBC # FLD AUTO: 5.29 K/UL — SIGNIFICANT CHANGE UP (ref 3.8–10.5)

## 2020-08-14 PROCEDURE — G0452: CPT | Mod: 26

## 2020-08-14 PROCEDURE — 81206 BCR/ABL1 GENE MAJOR BP: CPT

## 2020-08-17 LAB
ALBUMIN SERPL ELPH-MCNC: 4.8 G/DL
ALP BLD-CCNC: 86 U/L
ALT SERPL-CCNC: 13 U/L
ANION GAP SERPL CALC-SCNC: 14 MMOL/L
AST SERPL-CCNC: 21 U/L
BILIRUB SERPL-MCNC: 0.3 MG/DL
BUN SERPL-MCNC: 18 MG/DL
CALCIUM SERPL-MCNC: 9.6 MG/DL
CHLORIDE SERPL-SCNC: 105 MMOL/L
CO2 SERPL-SCNC: 22 MMOL/L
CREAT SERPL-MCNC: 0.83 MG/DL
GLUCOSE SERPL-MCNC: 90 MG/DL
LDH SERPL-CCNC: 225 U/L
POTASSIUM SERPL-SCNC: 4.6 MMOL/L
PROT SERPL-MCNC: 6.9 G/DL
SODIUM SERPL-SCNC: 142 MMOL/L

## 2020-08-19 LAB — BCR/ABL BY RT - PCR QUANTITATIVE: SIGNIFICANT CHANGE UP

## 2020-10-14 ENCOUNTER — OUTPATIENT (OUTPATIENT)
Dept: OUTPATIENT SERVICES | Facility: HOSPITAL | Age: 60
LOS: 1 days | Discharge: HOME | End: 2020-10-14

## 2020-10-14 DIAGNOSIS — C92.10 CHRONIC MYELOID LEUKEMIA, BCR/ABL-POSITIVE, NOT HAVING ACHIEVED REMISSION: ICD-10-CM

## 2020-10-14 DIAGNOSIS — Z98.891 HISTORY OF UTERINE SCAR FROM PREVIOUS SURGERY: Chronic | ICD-10-CM

## 2020-10-16 ENCOUNTER — APPOINTMENT (OUTPATIENT)
Dept: HEMATOLOGY ONCOLOGY | Facility: CLINIC | Age: 60
End: 2020-10-16
Payer: COMMERCIAL

## 2020-10-16 PROCEDURE — 99213 OFFICE O/P EST LOW 20 MIN: CPT | Mod: 95

## 2020-10-16 NOTE — HISTORY OF PRESENT ILLNESS
[Disease:__________________________] : Disease: [unfilled] [Therapy: ___] : Therapy: [unfilled] [___________________________________] : Drug: [unfilled] [Home] : at home, [unfilled] , at the time of the visit. [0 - No Distress] : Distress Level: 0 [Patient] : the patient [Medical Office: ([facility])___] : at ~his/her~ medical office located in [place] [Family Member] : family member [Self] : self [de-identified] : intermediate risk  [FreeTextEntry1] : imatinib  [de-identified] : 60yo F originally from [facility] with a history of HTN who was admitted to [facility] after her WBC on outpatient labs was >80k. She was asymptomatic at the time. Her flow cytometry had 1.2% myeloblasts (HLA-DR , CD 33, CD 34,  positive). BCR-ABL was positive (>10%) and JAK2 was negative. The patient had CT scans of chest/abdomen/pelvis showed splenomegaly (14 cm) and an aortocaval node  measuring 1.9 x 1.4 cm. Bone marrow biopsy done 10/16. Imatinib 400 mg daily initiated. She is intermediate-high risk based on Sokal and Hasford risk calculations. Treatment course was complicated by imatinib-induced lichen planus (biopsy proven) which is controlled with topical medications while continuing on the imatinib. [de-identified] : lichen planus (biopsy proven, 8/31/18) [de-identified] :  Patient is on Imatinib 400 mg daily, tolerated well. \par \par Patient complaints of anal itchiness, unable to afford consultation with GI. \par \par No other changes in her medical, surgical or social history since 8/10/2020.  [TWNoteComboBox3] : Grade: 1 [de-identified] : Dermatologic: [FreeTextEntry2] : Rio Maldonado [FreeTextEntry4] : relative

## 2020-10-16 NOTE — REASON FOR VISIT
[Chronic Leukemia] : chronic leukemia [[employer] ] : provided by [employer]   [Follow-Up Visit] : a follow-up visit for [FreeTextEntry1] : [id number] [FreeTextEntry2] : Mayur [TWNoteComboBox1] : Rwandan

## 2020-10-16 NOTE — ASSESSMENT
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 \par \par 1. CML, chronic phase: asymptomatic at time of diagnosis September 2017 (WBC 83, ). bcr-abl >10% with negative JAK2. Bone marrow biopsy (10/16/2017) with minimal specimen but enough to confirm CML. Imatinib 400mg daily was initiated. Her quant bcr-abl has been followed at 3 month intervals along with her CBC. \par - has had complete hematologic response since initiation of imatinib. \par -bcr-abl initially improving until imatinib was held for evaluation of her eventually diagnosed lichen planus. Once the imatinib was restarted her bcr-abl started to downtrend again though has remained detectable (<1%).\par BCR-ABL will be ordered. \par \par 2. 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 \par -There are several case reports in the literature of lichen planus associated with imatinib. Studies have shown skin toxicity in up 17% (all grade) at the 400mg dose. \par -Rash is steadily improving with each visit and does not interfere with her daily activities. she reports no longer taking most of the prescribed but OTC creams as needed.\par \par This service was provided by using telehealth. The patient was at home and I was at [facility]. The patient requested and participated in this encounter. The encounter face to face last 30   minutes coordinating his/her care and counseling.\par \par \par RTC 4 months.

## 2020-10-16 NOTE — PHYSICAL EXAM
[Restricted in physically strenuous activity but ambulatory and able to carry out work of a light or sedentary nature] : Status 1- Restricted in physically strenuous activity but ambulatory and able to carry out work of a light or sedentary nature, e.g., light house work, office work [Obese] : obese [Normal] : affect appropriate [Thrush] : no thrush [de-identified] : diffuse hyperpigmentation of the skin in the face, arms,  legs,  abdomen, buttocks, back. Some of the patches are lighter than others specially in the back.

## 2020-10-16 NOTE — REVIEW OF SYSTEMS
[Anxiety] : anxiety [Negative] : Allergic/Immunologic [Recent Change In Weight] : ~T no recent weight change [Fatigue] : no fatigue [Fever] : no fever [Vision Problems] : no vision problems [Dysphagia] : no dysphagia [Chest Pain] : no chest pain [Lower Ext Edema] : no lower extremity edema [Cough] : no cough [Constipation] : no constipation [Abdominal Pain] : no abdominal pain [Joint Stiffness] : no joint stiffness [Muscle Pain] : no muscle pain [Diarrhea] : no diarrhea [Skin Rash] : skin rash [Difficulty Walking] : no difficulty walking [Easy Bleeding] : no tendency for easy bleeding [Easy Bruising] : no tendency for easy bruising [Swollen Glands] : no swollen glands [de-identified] : diffuse rash.

## 2020-10-19 ENCOUNTER — RESULT REVIEW (OUTPATIENT)
Age: 60
End: 2020-10-19

## 2020-10-19 ENCOUNTER — APPOINTMENT (OUTPATIENT)
Dept: HEMATOLOGY ONCOLOGY | Facility: CLINIC | Age: 60
End: 2020-10-19

## 2020-10-19 ENCOUNTER — OUTPATIENT (OUTPATIENT)
Dept: OUTPATIENT SERVICES | Facility: HOSPITAL | Age: 60
LOS: 1 days | End: 2020-10-19
Payer: COMMERCIAL

## 2020-10-19 DIAGNOSIS — C92.10 CHRONIC MYELOID LEUKEMIA, BCR/ABL-POSITIVE, NOT HAVING ACHIEVED REMISSION: ICD-10-CM

## 2020-10-19 DIAGNOSIS — Z98.891 HISTORY OF UTERINE SCAR FROM PREVIOUS SURGERY: Chronic | ICD-10-CM

## 2020-10-19 LAB
ALBUMIN SERPL ELPH-MCNC: 4.6 G/DL
ALP BLD-CCNC: 82 U/L
ALT SERPL-CCNC: 14 U/L
ANION GAP SERPL CALC-SCNC: 11 MMOL/L
AST SERPL-CCNC: 18 U/L
BASOPHILS # BLD AUTO: 0.04 K/UL — SIGNIFICANT CHANGE UP (ref 0–0.2)
BASOPHILS NFR BLD AUTO: 0.8 % — SIGNIFICANT CHANGE UP (ref 0–2)
BILIRUB SERPL-MCNC: 0.3 MG/DL
BUN SERPL-MCNC: 12 MG/DL
CALCIUM SERPL-MCNC: 9.3 MG/DL
CHLORIDE SERPL-SCNC: 102 MMOL/L
CO2 SERPL-SCNC: 25 MMOL/L
CREAT SERPL-MCNC: 0.65 MG/DL
EOSINOPHIL # BLD AUTO: 0.58 K/UL — HIGH (ref 0–0.5)
EOSINOPHIL NFR BLD AUTO: 11.2 % — HIGH (ref 0–6)
GLUCOSE SERPL-MCNC: 90 MG/DL
HCT VFR BLD CALC: 36.4 % — SIGNIFICANT CHANGE UP (ref 34.5–45)
HGB BLD-MCNC: 11.8 G/DL — SIGNIFICANT CHANGE UP (ref 11.5–15.5)
IMM GRANULOCYTES NFR BLD AUTO: 0.2 % — SIGNIFICANT CHANGE UP (ref 0–1.5)
LYMPHOCYTES # BLD AUTO: 1.89 K/UL — SIGNIFICANT CHANGE UP (ref 1–3.3)
LYMPHOCYTES # BLD AUTO: 36.6 % — SIGNIFICANT CHANGE UP (ref 13–44)
MCHC RBC-ENTMCNC: 31 PG — SIGNIFICANT CHANGE UP (ref 27–34)
MCHC RBC-ENTMCNC: 32.4 G/DL — SIGNIFICANT CHANGE UP (ref 32–36)
MCV RBC AUTO: 95.5 FL — SIGNIFICANT CHANGE UP (ref 80–100)
MONOCYTES # BLD AUTO: 0.43 K/UL — SIGNIFICANT CHANGE UP (ref 0–0.9)
MONOCYTES NFR BLD AUTO: 8.3 % — SIGNIFICANT CHANGE UP (ref 2–14)
NEUTROPHILS # BLD AUTO: 2.21 K/UL — SIGNIFICANT CHANGE UP (ref 1.8–7.4)
NEUTROPHILS NFR BLD AUTO: 42.9 % — LOW (ref 43–77)
NRBC # BLD: 0 /100 WBCS — SIGNIFICANT CHANGE UP (ref 0–0)
PLATELET # BLD AUTO: 200 K/UL — SIGNIFICANT CHANGE UP (ref 150–400)
POTASSIUM SERPL-SCNC: 3.9 MMOL/L
PROT SERPL-MCNC: 6.9 G/DL
RBC # BLD: 3.81 M/UL — SIGNIFICANT CHANGE UP (ref 3.8–5.2)
RBC # FLD: 14.6 % — HIGH (ref 10.3–14.5)
SODIUM SERPL-SCNC: 138 MMOL/L
WBC # BLD: 5.16 K/UL — SIGNIFICANT CHANGE UP (ref 3.8–10.5)
WBC # FLD AUTO: 5.16 K/UL — SIGNIFICANT CHANGE UP (ref 3.8–10.5)

## 2020-10-19 PROCEDURE — 81206 BCR/ABL1 GENE MAJOR BP: CPT

## 2020-10-19 PROCEDURE — G0452: CPT

## 2020-10-23 LAB — BCR/ABL BY RT - PCR QUANTITATIVE: SIGNIFICANT CHANGE UP

## 2020-12-08 ENCOUNTER — APPOINTMENT (OUTPATIENT)
Dept: GASTROENTEROLOGY | Facility: HOSPITAL | Age: 60
End: 2020-12-08
Payer: COMMERCIAL

## 2020-12-08 ENCOUNTER — OUTPATIENT (OUTPATIENT)
Dept: OUTPATIENT SERVICES | Facility: HOSPITAL | Age: 60
LOS: 1 days | End: 2020-12-08
Payer: COMMERCIAL

## 2020-12-08 VITALS
DIASTOLIC BLOOD PRESSURE: 79 MMHG | WEIGHT: 176 LBS | SYSTOLIC BLOOD PRESSURE: 122 MMHG | HEART RATE: 84 BPM | RESPIRATION RATE: 14 BRPM | BODY MASS INDEX: 40.73 KG/M2 | HEIGHT: 55 IN

## 2020-12-08 DIAGNOSIS — Z98.891 HISTORY OF UTERINE SCAR FROM PREVIOUS SURGERY: Chronic | ICD-10-CM

## 2020-12-08 DIAGNOSIS — L29.0 PRURITUS ANI: ICD-10-CM

## 2020-12-08 DIAGNOSIS — R10.9 UNSPECIFIED ABDOMINAL PAIN: ICD-10-CM

## 2020-12-08 DIAGNOSIS — R10.13 EPIGASTRIC PAIN: ICD-10-CM

## 2020-12-08 PROCEDURE — G0463: CPT

## 2020-12-08 PROCEDURE — 99213 OFFICE O/P EST LOW 20 MIN: CPT | Mod: GC

## 2020-12-08 RX ORDER — FAMOTIDINE 20 MG/1
20 TABLET, FILM COATED ORAL DAILY
Qty: 90 | Refills: 0 | Status: ACTIVE | COMMUNITY
Start: 2020-12-08 | End: 1900-01-01

## 2020-12-08 RX ORDER — PSYLLIUM SEED (WITH DEXTROSE)
28 POWDER (GRAM) ORAL
Qty: 1 | Refills: 0 | Status: ACTIVE | COMMUNITY
Start: 2020-12-08 | End: 1900-01-01

## 2020-12-08 NOTE — HISTORY OF PRESENT ILLNESS
[___ Month(s) Ago] : [unfilled] month(s) ago [Heartburn] : denies heartburn [Nausea] : denies nausea [Vomiting] : denies vomiting [Diarrhea] : denies diarrhea [Constipation] : denies constipation [Yellow Skin Or Eyes (Jaundice)] : denies jaundice [Abdominal Swelling] : denies abdominal swelling [Rectal Pain] : denies rectal pain [_________] : Performed [unfilled] [Abdominal Pain] : stable abdominal pain [Wt Loss ___ Lbs] : no recent weight loss [de-identified] : egd/colon [de-identified] : treated for hpylori [de-identified] : 60 y/o Georgian-speaking F hx of CML on imatinib, H. pylori gastritis s/p quadruple therapy (2019), HTN, and morbid obesity here for follow up.\par \par  serviced provided via [employer] ID number [id number].\par \par Patient primarily complaining of anal itching. She states she has itching when she has a BM especially. She states she has a BM daily and it is usually hard. She often strains to have a BM. She denies hematochezia or blood streaking. Denies weight changes.   She additionally has intermittent epigastric discomfort worse with meals. She states it is a burning pain. She denies nausea or vomiting, diarrhea, chest pain or SOB. \par \par \par  [de-identified] : Impression:          - Normal esophagus.\par                      - Z-line regular, 38 cm from the incisors.\par                      - Normal stomach. Biopsied for H pylori.\par                      - Normal examined duodenum.\par                      - Etiology for nausea/vomiting not identified on this examination.\par Recommendation:      - Await pathology results. Treat if positive for H. pylori.\par                      - Perform a colonoscopy today. [de-identified] : Impression:          - One 2 mm hyperplastic polyp at the recto-sigmoid colon.\par                      - The examined portion of the ileum was normal.\par                      - No specimens collected.\par Recommendation:      - Discharge patient to home (with escort).\par                      - Repeat colonoscopy in 10 years for screening purposes; earlier if dictated \par                      by clinical events or family history.

## 2020-12-08 NOTE — END OF VISIT
[] : Fellow [FreeTextEntry3] : As modified and discussed with patient\par MD [facility]ITH [facility]\par Associate Professor of Medicine\par [facility] [employer]\par

## 2020-12-08 NOTE — ASSESSMENT
[FreeTextEntry1] : Impression:\par # Constipation: having 1 hard BM daily and requires straining. Likely contributing to the hemorrhoids.\par # Anal itching: likely 2/2 hemorrhoids\par # dypepsia: occasional epigasstric pain with food. Not daily, without alarm features and prior EGD with hpylori s/p quadruple therapy treatment\par # Hx of hpylori s/p quadruple therapy in 11/2019\par # Morbid obesity: BMI 40\par # Colorectal cancer screening:colonoscopy 11/2019 with one hyperplastic polyp. Next colonoscopy in 11/2029\par \par Plan: \par - metamucil (or OTC fiber supplementation) ordered to bulk stools\par - aloe wipes as needed for itching\par - check hpylori stool antigen for clearance\par - pepcid prn for dypepsia\par - return to clinic in 1 month\par

## 2020-12-08 NOTE — REVIEW OF SYSTEMS
[As Noted in HPI] : as noted in HPI [Fever] : no fever [Chills] : no chills [Eye Pain] : no eye pain [Red Eyes] : eyes not red [Sore Throat] : no sore throat [Hoarseness] : no hoarseness [Chest Pain] : no chest pain [Palpitations] : no palpitations [Shortness Of Breath] : no shortness of breath [Cough] : no cough [Abdominal Pain] : no abdominal pain [Vomiting] : no vomiting [Constipation] : no constipation [Diarrhea] : no diarrhea [Heartburn] : no heartburn [Melena] : no melena [Dysuria] : no dysuria [Incontinence] : no incontinence [Arthralgias] : no arthralgias [Joint Pain] : no joint pain [Skin Lesions] : no skin lesions [Skin Wound] : no skin wound [Dizziness] : no dizziness [Fainting] : no fainting [Anxiety] : no anxiety [Depression] : no depression

## 2020-12-08 NOTE — PHYSICAL EXAM
[General Appearance - Alert] : alert [General Appearance - In No Acute Distress] : in no acute distress [Sclera] : the sclera and conjunctiva were normal [PERRL With Normal Accommodation] : pupils were equal in size, round, and reactive to light [Oropharynx] : the oropharynx was normal [Neck Appearance] : the appearance of the neck was normal [Exaggerated Use Of Accessory Muscles For Inspiration] : no accessory muscle use [Apical Impulse] : the apical impulse was normal [Heart Rate And Rhythm] : heart rate was normal and rhythm regular [Bowel Sounds] : normal bowel sounds [Abdomen Soft] : soft [Abdomen Tenderness] : non-tender [No CVA Tenderness] : no ~M costovertebral angle tenderness [Abnormal Walk] : normal gait [Musculoskeletal - Swelling] : no joint swelling seen [] : no rash [Skin Lesions] : no skin lesions [Sensation] : the sensory exam was normal to light touch and pinprick [Motor Exam] : the motor exam was normal [Oriented To Time, Place, And Person] : oriented to person, place, and time [Affect] : the affect was normal [Both Tympanic Membranes Were Examined] : both tympanic membranes were normal [FreeTextEntry1] : Obese

## 2020-12-09 DIAGNOSIS — K59.00 CONSTIPATION, UNSPECIFIED: ICD-10-CM

## 2020-12-09 DIAGNOSIS — L29.0 PRURITUS ANI: ICD-10-CM

## 2020-12-09 DIAGNOSIS — E66.01 MORBID (SEVERE) OBESITY DUE TO EXCESS CALORIES: ICD-10-CM

## 2020-12-09 DIAGNOSIS — A04.8 OTHER SPECIFIED BACTERIAL INTESTINAL INFECTIONS: ICD-10-CM

## 2020-12-09 DIAGNOSIS — R10.13 EPIGASTRIC PAIN: ICD-10-CM

## 2020-12-15 PROBLEM — Z01.419 ENCOUNTER FOR ANNUAL ROUTINE GYNECOLOGICAL EXAMINATION: Status: RESOLVED | Noted: 2017-10-18 | Resolved: 2020-12-15

## 2020-12-29 ENCOUNTER — OUTPATIENT (OUTPATIENT)
Dept: OUTPATIENT SERVICES | Facility: HOSPITAL | Age: 60
LOS: 1 days | End: 2020-12-29

## 2020-12-29 DIAGNOSIS — K59.00 CONSTIPATION, UNSPECIFIED: ICD-10-CM

## 2020-12-29 DIAGNOSIS — R10.13 EPIGASTRIC PAIN: ICD-10-CM

## 2020-12-29 DIAGNOSIS — Z98.891 HISTORY OF UTERINE SCAR FROM PREVIOUS SURGERY: Chronic | ICD-10-CM

## 2020-12-29 DIAGNOSIS — A04.8 OTHER SPECIFIED BACTERIAL INTESTINAL INFECTIONS: ICD-10-CM

## 2020-12-29 DIAGNOSIS — E66.01 MORBID (SEVERE) OBESITY DUE TO EXCESS CALORIES: ICD-10-CM

## 2020-12-29 DIAGNOSIS — L29.0 PRURITUS ANI: ICD-10-CM

## 2021-01-04 ENCOUNTER — OUTPATIENT (OUTPATIENT)
Dept: OUTPATIENT SERVICES | Facility: HOSPITAL | Age: 61
LOS: 1 days | End: 2021-01-04
Payer: COMMERCIAL

## 2021-01-04 DIAGNOSIS — K59.00 CONSTIPATION, UNSPECIFIED: ICD-10-CM

## 2021-01-04 DIAGNOSIS — Z98.891 HISTORY OF UTERINE SCAR FROM PREVIOUS SURGERY: Chronic | ICD-10-CM

## 2021-01-04 DIAGNOSIS — L29.0 PRURITUS ANI: ICD-10-CM

## 2021-01-04 DIAGNOSIS — E66.01 MORBID (SEVERE) OBESITY DUE TO EXCESS CALORIES: ICD-10-CM

## 2021-01-04 DIAGNOSIS — R10.13 EPIGASTRIC PAIN: ICD-10-CM

## 2021-01-04 DIAGNOSIS — A04.8 OTHER SPECIFIED BACTERIAL INTESTINAL INFECTIONS: ICD-10-CM

## 2021-01-04 PROCEDURE — 87338 HPYLORI STOOL AG IA: CPT

## 2021-01-06 LAB — H PYLORI AG STL QL: DETECTED

## 2021-01-12 RX ORDER — AMOXICILLIN 500 MG/1
500 CAPSULE ORAL TWICE DAILY
Qty: 28 | Refills: 0 | Status: ACTIVE | COMMUNITY
Start: 2021-01-12 | End: 1900-01-01

## 2021-01-12 RX ORDER — LEVOFLOXACIN 500 MG/1
500 TABLET, FILM COATED ORAL DAILY
Qty: 14 | Refills: 0 | Status: ACTIVE | COMMUNITY
Start: 2021-01-12 | End: 1900-01-01

## 2021-01-19 ENCOUNTER — NON-APPOINTMENT (OUTPATIENT)
Age: 61
End: 2021-01-19

## 2021-01-19 ENCOUNTER — OUTPATIENT (OUTPATIENT)
Dept: OUTPATIENT SERVICES | Facility: HOSPITAL | Age: 61
LOS: 1 days | End: 2021-01-19
Payer: COMMERCIAL

## 2021-01-19 ENCOUNTER — APPOINTMENT (OUTPATIENT)
Dept: INTERNAL MEDICINE | Facility: CLINIC | Age: 61
End: 2021-01-19
Payer: COMMERCIAL

## 2021-01-19 VITALS
SYSTOLIC BLOOD PRESSURE: 134 MMHG | DIASTOLIC BLOOD PRESSURE: 78 MMHG | WEIGHT: 172 LBS | BODY MASS INDEX: 39.81 KG/M2 | HEIGHT: 55 IN

## 2021-01-19 DIAGNOSIS — I10 ESSENTIAL (PRIMARY) HYPERTENSION: ICD-10-CM

## 2021-01-19 DIAGNOSIS — Z98.891 HISTORY OF UTERINE SCAR FROM PREVIOUS SURGERY: Chronic | ICD-10-CM

## 2021-01-19 PROCEDURE — G0463: CPT

## 2021-01-19 PROCEDURE — 99213 OFFICE O/P EST LOW 20 MIN: CPT | Mod: GE

## 2021-01-20 NOTE — REVIEW OF SYSTEMS
[Fatigue] : fatigue [Joint Pain] : joint pain [Joint Stiffness] : joint stiffness [Muscle Pain] : muscle pain [Back Pain] : back pain [Fever] : no fever [Chills] : no chills [Night Sweats] : no night sweats [Vision Problems] : no vision problems [Chest Pain] : no chest pain [Palpitations] : no palpitations [Shortness Of Breath] : no shortness of breath [Wheezing] : no wheezing [Cough] : no cough [Abdominal Pain] : no abdominal pain [Nausea] : no nausea [Vomiting] : no vomiting [Headache] : no headache [Dizziness] : no dizziness [Memory Loss] : no memory loss

## 2021-01-20 NOTE — PLAN
[FreeTextEntry1] : #Neck Pain\par -occasional sharp neck pain without any warning symptoms \par -favor conservative management at this time with acetaminophen, do not use ibuprofen at this time because of hx of gastritis and h pylori\par -physical therapy referral given\par -continue to monitor symptoms. \par \par #HTN\par -continue lisinopril 40mg qd at this time\par \par #Chronic phase CML\par -management per heme onc\par \par #Morbid obesity\par -encouraging active lifestyle and healthy eating habits \par \par #lichen planus\par -management per dermatology \par \par #Health Maintenance\par -gyn referral given for pap\par \par Follow up in 15 weeks. \par \par

## 2021-01-20 NOTE — PHYSICAL EXAM
[No Acute Distress] : no acute distress [Well Nourished] : well nourished [Well Developed] : well developed [Normal Sclera/Conjunctiva] : normal sclera/conjunctiva [PERRL] : pupils equal round and reactive to light [No Respiratory Distress] : no respiratory distress  [No Accessory Muscle Use] : no accessory muscle use [Clear to Auscultation] : lungs were clear to auscultation bilaterally [Normal Rate] : normal rate  [Regular Rhythm] : with a regular rhythm [Normal S1, S2] : normal S1 and S2 [Soft] : abdomen soft [Non Tender] : non-tender [Non-distended] : non-distended [Normal Bowel Sounds] : normal bowel sounds [Grossly Normal Strength/Tone] : grossly normal strength/tone [Coordination Grossly Intact] : coordination grossly intact [No Focal Deficits] : no focal deficits [Normal Gait] : normal gait [Normal Affect] : the affect was normal [Normal Insight/Judgement] : insight and judgment were intact [de-identified] : plaques on extensor surfaces of arms

## 2021-01-20 NOTE — HISTORY OF PRESENT ILLNESS
[FreeTextEntry8] : 60yo Tristanian-speaking F with hx HTN, morbid obesity, chronic phase CML (dx 9/2017) on oral chemo Imatinib, Imatinib-induced lichen planus presents for medication refill and neck pain Patient with neck pain exacerbation at rest and with exertion. Not associated with nausea/ vomiting, headache, vision changes, or other warning symptoms. She denies any weakness associated with the pain or sensory deficit. Patient works as a . Sharp pain without radiation to occiput or superior but radiates down spinal column. \par \par She would like lisinopril refill and referral to obgyn.

## 2021-01-20 NOTE — ASSESSMENT
[FreeTextEntry1] : 58yo Surinamese-speaking F with hx HTN, morbid obesity, chronic phase CML (dx 9/2017) on oral chemo Imatinib, Imatinib-induced lichen planus presents for medication refill and neck pain without warning symptoms favoring conservation management at this time.

## 2021-01-21 DIAGNOSIS — M54.2 CERVICALGIA: ICD-10-CM

## 2021-02-09 ENCOUNTER — OUTPATIENT (OUTPATIENT)
Dept: OUTPATIENT SERVICES | Facility: HOSPITAL | Age: 61
LOS: 1 days | End: 2021-02-09
Payer: COMMERCIAL

## 2021-02-09 ENCOUNTER — APPOINTMENT (OUTPATIENT)
Dept: GASTROENTEROLOGY | Facility: HOSPITAL | Age: 61
End: 2021-02-09
Payer: COMMERCIAL

## 2021-02-09 VITALS
HEART RATE: 76 BPM | HEIGHT: 55 IN | DIASTOLIC BLOOD PRESSURE: 80 MMHG | SYSTOLIC BLOOD PRESSURE: 146 MMHG | RESPIRATION RATE: 14 BRPM | WEIGHT: 174 LBS | TEMPERATURE: 97 F | BODY MASS INDEX: 40.27 KG/M2

## 2021-02-09 DIAGNOSIS — A04.9 BACTERIAL INTESTINAL INFECTION, UNSPECIFIED: ICD-10-CM

## 2021-02-09 DIAGNOSIS — E66.01 MORBID (SEVERE) OBESITY DUE TO EXCESS CALORIES: ICD-10-CM

## 2021-02-09 DIAGNOSIS — R10.9 UNSPECIFIED ABDOMINAL PAIN: ICD-10-CM

## 2021-02-09 DIAGNOSIS — Z98.891 HISTORY OF UTERINE SCAR FROM PREVIOUS SURGERY: Chronic | ICD-10-CM

## 2021-02-09 DIAGNOSIS — K59.00 CONSTIPATION, UNSPECIFIED: ICD-10-CM

## 2021-02-09 PROCEDURE — G0463: CPT

## 2021-02-09 PROCEDURE — 99213 OFFICE O/P EST LOW 20 MIN: CPT | Mod: GC

## 2021-02-09 RX ORDER — OMEPRAZOLE 20 MG/1
20 TABLET, DELAYED RELEASE ORAL
Qty: 28 | Refills: 0 | Status: COMPLETED | COMMUNITY
Start: 2019-11-29 | End: 2021-02-09

## 2021-02-09 NOTE — HISTORY OF PRESENT ILLNESS
[___ Month(s) Ago] : [unfilled] month(s) ago [Heartburn] : denies heartburn [Nausea] : denies nausea [Diarrhea] : denies diarrhea [Vomiting] : denies vomiting [Constipation] : denies constipation [Yellow Skin Or Eyes (Jaundice)] : denies jaundice [Abdominal Pain] : stable abdominal pain [Rectal Pain] : denies rectal pain [Abdominal Swelling] : denies abdominal swelling [_________] : Performed [unfilled] [Wt Loss ___ Lbs] : no recent weight loss [de-identified] : treated for hpylori [de-identified] : 60 yo Bangladeshi-speaking F PMH CML on imatinib, H. pylori gastritis, HTN, and morbid obesity here for follow up.\par \par  serviced provided via [employer] ID number [id number]\par \par Patient feels well. On her last visit she was found to have a persistently positive hpylori stool antigen. She completed triple therapy levaquin based hpylori treatment. She tolerated the medications well. She denies nausea, vomiting, dyspepsia, weight loss, GERD or abdominal pain. \par \par \par \par  [de-identified] : Impression:          - Normal esophagus.\par                      - Z-line regular, 38 cm from the incisors.\par                      - Normal stomach. Biopsied for H pylori.\par                      - Normal examined duodenum.\par                      - Etiology for nausea/vomiting not identified on this examination.\par Recommendation:      - Await pathology results. Treat if positive for H. pylori.\par                      - Perform a colonoscopy today. [de-identified] : Impression:          - One 2 mm hyperplastic polyp at the recto-sigmoid colon.\par                      - The examined portion of the ileum was normal.\par                      - No specimens collected.\par Recommendation:      - Discharge patient to home (with escort).\par                      - Repeat colonoscopy in 10 years for screening purposes; earlier if dictated \par                      by clinical events or family history.

## 2021-02-09 NOTE — END OF VISIT
[] : Fellow [FreeTextEntry3] : As modified and discussed with patient\par MD KEITH Hernandez [facility]\par Associate Professor of Medicine\par [facility]\par

## 2021-02-09 NOTE — ASSESSMENT
[FreeTextEntry1] : Impression:\par # Hx of hpylori s/p quadruple therapy in 11/2019\par # Dypepsia: resolved\par # constipation: on metamucil with normal daily BMs\par # Morbid obesity: BMI 40\par # Colorectal cancer screening:colonoscopy 11/2019 with one hyperplastic polyp. Next colonoscopy in 11/2029\par \par Plan: \par - discontinue ppi therapy now\par - recheck hpylori stool antigen in 2 weeks now off ppi\par - pepcid prn for dyspepsia\par - metamucil before breakfast and supper daily for constipation\par - will call patient with results of stool antigen\par - lifestyle modifications\par - follow up in 6 months of hpylori now eradicated \par

## 2021-02-09 NOTE — REVIEW OF SYSTEMS
[As Noted in HPI] : as noted in HPI [Fever] : no fever [Chills] : no chills [Eye Pain] : no eye pain [Red Eyes] : eyes not red [Sore Throat] : no sore throat [Hoarseness] : no hoarseness [Chest Pain] : no chest pain [Palpitations] : no palpitations [Cough] : no cough [Shortness Of Breath] : no shortness of breath [Abdominal Pain] : no abdominal pain [Vomiting] : no vomiting [Constipation] : no constipation [Diarrhea] : no diarrhea [Heartburn] : no heartburn [Melena] : no melena [Incontinence] : no incontinence [Dysuria] : no dysuria [Arthralgias] : no arthralgias [Joint Pain] : no joint pain [Skin Lesions] : no skin lesions [Skin Wound] : no skin wound [Dizziness] : no dizziness [Fainting] : no fainting [Anxiety] : no anxiety [Depression] : no depression

## 2021-02-17 ENCOUNTER — OUTPATIENT (OUTPATIENT)
Dept: OUTPATIENT SERVICES | Facility: HOSPITAL | Age: 61
LOS: 1 days | Discharge: HOME | End: 2021-02-17

## 2021-02-17 DIAGNOSIS — C92.10 CHRONIC MYELOID LEUKEMIA, BCR/ABL-POSITIVE, NOT HAVING ACHIEVED REMISSION: ICD-10-CM

## 2021-02-17 DIAGNOSIS — Z98.891 HISTORY OF UTERINE SCAR FROM PREVIOUS SURGERY: Chronic | ICD-10-CM

## 2021-02-19 ENCOUNTER — APPOINTMENT (OUTPATIENT)
Dept: HEMATOLOGY ONCOLOGY | Facility: CLINIC | Age: 61
End: 2021-02-19
Payer: COMMERCIAL

## 2021-02-19 DIAGNOSIS — R51.9 HEADACHE, UNSPECIFIED: ICD-10-CM

## 2021-02-19 PROCEDURE — 99212 OFFICE O/P EST SF 10 MIN: CPT | Mod: 95

## 2021-02-19 NOTE — REASON FOR VISIT
[Follow-Up Visit] : a follow-up visit for [[employer] ] : provided by [employer]   [Chronic Leukemia] : chronic leukemia [FreeTextEntry1] : [id number] [FreeTextEntry2] : Mayur [TWNoteComboBox1] : Vietnamese

## 2021-02-19 NOTE — REVIEW OF SYSTEMS
[Skin Rash] : skin rash [Anxiety] : anxiety [Negative] : Allergic/Immunologic [Fever] : no fever [Fatigue] : no fatigue [Recent Change In Weight] : ~T no recent weight change [Vision Problems] : no vision problems [Dysphagia] : no dysphagia [Chest Pain] : no chest pain [Lower Ext Edema] : no lower extremity edema [Cough] : no cough [Abdominal Pain] : no abdominal pain [Constipation] : no constipation [Diarrhea] : no diarrhea [Joint Stiffness] : no joint stiffness [Muscle Pain] : no muscle pain [Difficulty Walking] : no difficulty walking [Easy Bleeding] : no tendency for easy bleeding [Easy Bruising] : no tendency for easy bruising [Swollen Glands] : no swollen glands [de-identified] : diffuse rash.  [de-identified] : headaches

## 2021-02-19 NOTE — ASSESSMENT
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 \par \par 1. CML, chronic phase: asymptomatic at time of diagnosis September 2017 (WBC 83, ). bcr-abl >10% with negative JAK2. Bone marrow biopsy (10/16/2017) with minimal specimen but enough to confirm CML. Imatinib 400mg daily was initiated. Her quant bcr-abl has been followed at 3 month intervals along with her CBC. \par - has had complete hematologic response since initiation of imatinib. \par -bcr-abl initially improving until imatinib was held for evaluation of her eventually diagnosed lichen planus. Once the imatinib was restarted her bcr-abl started to downtrend.\par BCR-ABL 10/19/2020- not detectable.  \par \par 2. 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 \par -There are several case reports in the literature of lichen planus associated with imatinib. Studies have shown skin toxicity in up 17% (all grade) at the 400mg dose. \par -Rash is steadily improving with each visit and does not interfere with her daily activities. she reports no longer taking most of the prescribed but OTC creams as needed.\par \par New onset of headaches for few months worsening lately localized to the occipital lobe; MRI of the brain was ordered.\par \par This service was provided by using telehealth. [place] [facility] [place] requested and participated in this encounter. The encounter face to face last 30   minutes coordinating his/her care and counseling.\par \par \par RTC 4 months.

## 2021-02-19 NOTE — HISTORY OF PRESENT ILLNESS
[Disease:__________________________] : Disease: [unfilled] [Therapy: ___] : Therapy: [unfilled] [___________________________________] : Drug: [unfilled] [0 - No Distress] : Distress Level: 0 [Home] : at home, [unfilled] , at the time of the visit. [Medical Office: ([facility])___] : at ~his/her~ medical office located in [place] [Family Member] : family member [Patient] : the patient [Self] : self [de-identified] : 59yo F originally from [facility] with a history of HTN who was admitted to [facility] after her WBC on outpatient labs was >80k. She was asymptomatic at the time. Her flow cytometry had 1.2% myeloblasts (HLA-DR , CD 33, CD 34,  positive). BCR-ABL was positive (>10%) and JAK2 was negative. The patient had CT scans of chest/abdomen/pelvis showed splenomegaly (14 cm) and an aortocaval node  measuring 1.9 x 1.4 cm. Bone marrow biopsy done 10/16. Imatinib 400 mg daily initiated. She is intermediate-high risk based on Sokal and Hasford risk calculations. Treatment course was complicated by imatinib-induced lichen planus (biopsy proven) which is controlled with topical medications while continuing on the imatinib. [de-identified] : intermediate risk  [de-identified] :  Patient is on Imatinib 400 mg daily, tolerated well. \par \par BCR/ABL 10/26/2020:  Not detected. \par \par Patient complaints of pain in the occipital area, that prevents her from doing activities of daily living. \par \par No other changes in her medical, surgical or social history since 10/19/2020.  [FreeTextEntry1] : imatinib  [de-identified] : lichen planus (biopsy proven, 8/31/18) [de-identified] : Dermatologic: [TWNoteComboBox3] : Grade: 1 [FreeTextEntry2] : [place]don[place] [FreeTextEntry4] : relative

## 2021-02-19 NOTE — PHYSICAL EXAM
[Restricted in physically strenuous activity but ambulatory and able to carry out work of a light or sedentary nature] : Status 1- Restricted in physically strenuous activity but ambulatory and able to carry out work of a light or sedentary nature, e.g., light house work, office work [Obese] : obese [Normal] : affect appropriate [Thrush] : no thrush [de-identified] : diffuse hyperpigmentation of the skin in the face, arms,  legs,  abdomen, buttocks, back. Some of the patches are lighter than others specially in the back.  [de-identified] : Headaches located to the occipital lobe.

## 2021-03-05 ENCOUNTER — RESULT REVIEW (OUTPATIENT)
Age: 61
End: 2021-03-05

## 2021-03-05 ENCOUNTER — APPOINTMENT (OUTPATIENT)
Dept: HEMATOLOGY ONCOLOGY | Facility: CLINIC | Age: 61
End: 2021-03-05

## 2021-03-05 LAB
ALBUMIN SERPL ELPH-MCNC: 4.1 G/DL
ALP BLD-CCNC: 87 U/L
ALT SERPL-CCNC: 11 U/L
ANION GAP SERPL CALC-SCNC: 9 MMOL/L
AST SERPL-CCNC: 18 U/L
BASOPHILS # BLD AUTO: 0.03 K/UL — SIGNIFICANT CHANGE UP (ref 0–0.2)
BASOPHILS NFR BLD AUTO: 0.6 % — SIGNIFICANT CHANGE UP (ref 0–2)
BILIRUB SERPL-MCNC: 0.2 MG/DL
BUN SERPL-MCNC: 20 MG/DL
CALCIUM SERPL-MCNC: 9 MG/DL
CHLORIDE SERPL-SCNC: 104 MMOL/L
CO2 SERPL-SCNC: 27 MMOL/L
CREAT SERPL-MCNC: 0.82 MG/DL
EOSINOPHIL # BLD AUTO: 0.6 K/UL — HIGH (ref 0–0.5)
EOSINOPHIL NFR BLD AUTO: 11.8 % — HIGH (ref 0–6)
GLUCOSE SERPL-MCNC: 92 MG/DL
HCT VFR BLD CALC: 35.8 % — SIGNIFICANT CHANGE UP (ref 34.5–45)
HGB BLD-MCNC: 11.7 G/DL — SIGNIFICANT CHANGE UP (ref 11.5–15.5)
IMM GRANULOCYTES NFR BLD AUTO: 0.2 % — SIGNIFICANT CHANGE UP (ref 0–1.5)
LDH SERPL-CCNC: 194 U/L
LYMPHOCYTES # BLD AUTO: 1.89 K/UL — SIGNIFICANT CHANGE UP (ref 1–3.3)
LYMPHOCYTES # BLD AUTO: 37.1 % — SIGNIFICANT CHANGE UP (ref 13–44)
MCHC RBC-ENTMCNC: 31.1 PG — SIGNIFICANT CHANGE UP (ref 27–34)
MCHC RBC-ENTMCNC: 32.7 G/DL — SIGNIFICANT CHANGE UP (ref 32–36)
MCV RBC AUTO: 95.2 FL — SIGNIFICANT CHANGE UP (ref 80–100)
MONOCYTES # BLD AUTO: 0.5 K/UL — SIGNIFICANT CHANGE UP (ref 0–0.9)
MONOCYTES NFR BLD AUTO: 9.8 % — SIGNIFICANT CHANGE UP (ref 2–14)
NEUTROPHILS # BLD AUTO: 2.07 K/UL — SIGNIFICANT CHANGE UP (ref 1.8–7.4)
NEUTROPHILS NFR BLD AUTO: 40.5 % — LOW (ref 43–77)
NRBC # BLD: 0 /100 WBCS — SIGNIFICANT CHANGE UP (ref 0–0)
PLATELET # BLD AUTO: 206 K/UL — SIGNIFICANT CHANGE UP (ref 150–400)
POTASSIUM SERPL-SCNC: 4.3 MMOL/L
PROT SERPL-MCNC: 6.8 G/DL
RBC # BLD: 3.76 M/UL — LOW (ref 3.8–5.2)
RBC # FLD: 14.8 % — HIGH (ref 10.3–14.5)
SODIUM SERPL-SCNC: 140 MMOL/L
WBC # BLD: 5.1 K/UL — SIGNIFICANT CHANGE UP (ref 3.8–10.5)
WBC # FLD AUTO: 5.1 K/UL — SIGNIFICANT CHANGE UP (ref 3.8–10.5)

## 2021-03-09 ENCOUNTER — OUTPATIENT (OUTPATIENT)
Dept: OUTPATIENT SERVICES | Facility: HOSPITAL | Age: 61
LOS: 1 days | End: 2021-03-09
Payer: COMMERCIAL

## 2021-03-09 DIAGNOSIS — A04.9 BACTERIAL INTESTINAL INFECTION, UNSPECIFIED: ICD-10-CM

## 2021-03-09 DIAGNOSIS — Z98.891 HISTORY OF UTERINE SCAR FROM PREVIOUS SURGERY: Chronic | ICD-10-CM

## 2021-03-09 DIAGNOSIS — E66.01 MORBID (SEVERE) OBESITY DUE TO EXCESS CALORIES: ICD-10-CM

## 2021-03-09 DIAGNOSIS — K59.00 CONSTIPATION, UNSPECIFIED: ICD-10-CM

## 2021-03-09 LAB — T(9;22)(ABL1,BCR)/CONTROL BLD/T: NORMAL

## 2021-03-09 PROCEDURE — 87338 HPYLORI STOOL AG IA: CPT

## 2021-03-12 ENCOUNTER — OUTPATIENT (OUTPATIENT)
Dept: OUTPATIENT SERVICES | Facility: HOSPITAL | Age: 61
LOS: 1 days | End: 2021-03-12
Payer: COMMERCIAL

## 2021-03-12 ENCOUNTER — APPOINTMENT (OUTPATIENT)
Dept: MRI IMAGING | Facility: CLINIC | Age: 61
End: 2021-03-12
Payer: COMMERCIAL

## 2021-03-12 ENCOUNTER — RESULT REVIEW (OUTPATIENT)
Age: 61
End: 2021-03-12

## 2021-03-12 DIAGNOSIS — Z98.891 HISTORY OF UTERINE SCAR FROM PREVIOUS SURGERY: Chronic | ICD-10-CM

## 2021-03-12 DIAGNOSIS — Z00.8 ENCOUNTER FOR OTHER GENERAL EXAMINATION: ICD-10-CM

## 2021-03-12 LAB — H PYLORI AG STL QL: SIGNIFICANT CHANGE UP

## 2021-03-12 PROCEDURE — 70553 MRI BRAIN STEM W/O & W/DYE: CPT

## 2021-03-12 PROCEDURE — A9585: CPT

## 2021-03-12 PROCEDURE — 70553 MRI BRAIN STEM W/O & W/DYE: CPT | Mod: 26

## 2021-04-07 ENCOUNTER — APPOINTMENT (OUTPATIENT)
Dept: NEUROLOGY | Facility: CLINIC | Age: 61
End: 2021-04-07
Payer: COMMERCIAL

## 2021-04-07 VITALS
DIASTOLIC BLOOD PRESSURE: 79 MMHG | SYSTOLIC BLOOD PRESSURE: 137 MMHG | WEIGHT: 180 LBS | HEART RATE: 69 BPM | BODY MASS INDEX: 41.66 KG/M2 | HEIGHT: 55 IN

## 2021-04-07 VITALS — TEMPERATURE: 97 F

## 2021-04-07 DIAGNOSIS — M54.2 CERVICALGIA: ICD-10-CM

## 2021-04-07 DIAGNOSIS — J34.9 UNSPECIFIED DISORDER OF NOSE AND NASAL SINUSES: ICD-10-CM

## 2021-04-07 PROCEDURE — 99072 ADDL SUPL MATRL&STAF TM PHE: CPT

## 2021-04-07 PROCEDURE — 99203 OFFICE O/P NEW LOW 30 MIN: CPT

## 2021-04-07 NOTE — REASON FOR VISIT
[Initial Evaluation] : an initial evaluation [[employer] ] : provided by [employer]   [FreeTextEntry2] : [place] [FreeTextEntry1] : [id number] [TWNoteComboBox1] : [place]

## 2021-04-07 NOTE — HISTORY OF PRESENT ILLNESS
[FreeTextEntry1] : 60 year old  female with h/o HTN, CML referred by PCP for neck pain that she has been having for the past 4 months that is intermittent. she reports  pain is sharp and  last seconds. it does not radiate. 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 \par ice packs helps\par Pain is not daily last occurred, month ago . Denies any headaches \par Denies any Associated symptoms: photophobia, phonophobia, dizziness, anosmia, Pertinent negatives: blurred vision,visual aura, scotoma, memory impairment, neck stiffness, nausea, numbness, tingling\par \par She was referred to PT, but did not go. \par  \par Diagnostics;\par Brain MRI- Mild periventricular and moderate bifrontal and biparietal deep and subcortical white matter ischemia. Small retention cysts BILATERAL maxillary alveolar recesses\par  \par

## 2021-04-07 NOTE — ASSESSMENT
[FreeTextEntry1] : 60 year old  female with h/o HTN,  chronic CML with chronic neck pain. on exam no cervical tenderness. ?msk she will complete PT as referred and will follow up there after for further investigation if needed.

## 2021-04-07 NOTE — PHYSICAL EXAM
[Affect] : the affect was normal [Mood] : the mood was normal [Person] : oriented to person [Place] : oriented to place [Time] : oriented to time [Short Term Intact] : short term memory intact [Remote Intact] : remote memory intact [Registration Intact] : recent registration memory intact [Span Intact] : the attention span was normal [Concentration Intact] : normal concentrating ability [Visual Intact] : visual attention was ~T not ~L decreased [Naming Objects] : no difficulty naming common objects [Repeating Phrases] : no difficulty repeating a phrase [Writing A Sentence] : no difficulty writing a sentence [Fluency] : fluency intact [Comprehension] : comprehension intact [Reading] : reading intact [Past History] : adequate knowledge of personal past history [Cranial Nerves Optic (II)] : visual acuity intact bilaterally,  visual fields full to confrontation, pupils equal round and reactive to light [Cranial Nerves Oculomotor (III)] : extraocular motion intact [Cranial Nerves Trigeminal (V)] : facial sensation intact symmetrically [Cranial Nerves Facial (VII)] : face symmetrical [Cranial Nerves Vestibulocochlear (VIII)] : hearing was intact bilaterally [Cranial Nerves Glossopharyngeal (IX)] : tongue and palate midline [Cranial Nerves Accessory (XI - Cranial And Spinal)] : head turning and shoulder shrug symmetric [Cranial Nerves Hypoglossal (XII)] : there was no tongue deviation with protrusion [Motor Tone] : muscle tone was normal in all four extremities [Motor Strength] : muscle strength was normal in all four extremities [No Muscle Atrophy] : normal bulk in all four extremities [Motor Handedness Right-Handed] : the patient is right hand dominant [Sensation Tactile Decrease] : light touch was intact [Abnormal Walk] : normal gait [Balance] : balance was intact [2+] : Ankle jerk left 2+ [Past-pointing] : there was no past-pointing [Tremor] : no tremor present [Plantar Reflex Right Only] : normal on the right [Plantar Reflex Left Only] : normal on the left

## 2021-04-19 ENCOUNTER — NON-APPOINTMENT (OUTPATIENT)
Age: 61
End: 2021-04-19

## 2021-05-28 ENCOUNTER — NON-APPOINTMENT (OUTPATIENT)
Age: 61
End: 2021-05-28

## 2021-05-28 NOTE — DISCUSSION/SUMMARY
[FreeTextEntry1] :  ED note: 60 yo F with PMH of HTN, gastritis, CML on treatment, H. Pylori p/w epigastric pain. Most c/w gastritis however will screen forn ACS, cholecystitis.\par - labs\par - RUQ sono\par - CXR\par - maalox, pepcid\par \par \par \par Spoke to patient on 5/28 w [employer]  ID [id number]. Feeling significantly better. Discharged on pantoprazole, and has an appointment with GI in two weeks.

## 2021-06-15 ENCOUNTER — OUTPATIENT (OUTPATIENT)
Dept: OUTPATIENT SERVICES | Facility: HOSPITAL | Age: 61
LOS: 1 days | End: 2021-06-15
Payer: COMMERCIAL

## 2021-06-15 ENCOUNTER — APPOINTMENT (OUTPATIENT)
Dept: GASTROENTEROLOGY | Facility: HOSPITAL | Age: 61
End: 2021-06-15
Payer: COMMERCIAL

## 2021-06-15 VITALS
DIASTOLIC BLOOD PRESSURE: 85 MMHG | TEMPERATURE: 96.6 F | BODY MASS INDEX: 40.27 KG/M2 | WEIGHT: 174 LBS | HEART RATE: 69 BPM | SYSTOLIC BLOOD PRESSURE: 133 MMHG | HEIGHT: 55 IN

## 2021-06-15 DIAGNOSIS — R10.9 UNSPECIFIED ABDOMINAL PAIN: ICD-10-CM

## 2021-06-15 DIAGNOSIS — K80.50 CALCULUS OF BILE DUCT W/OUT CHOLANGITIS OR CHOLECYSTITIS W/OUT OBSTRUCTION: ICD-10-CM

## 2021-06-15 DIAGNOSIS — K80.20 CALCULUS OF GALLBLADDER W/OUT CHOLECYSTITIS W/OUT OBSTRUCTION: ICD-10-CM

## 2021-06-15 DIAGNOSIS — A04.8 OTHER SPECIFIED BACTERIAL INTESTINAL INFECTIONS: ICD-10-CM

## 2021-06-15 DIAGNOSIS — Z98.891 HISTORY OF UTERINE SCAR FROM PREVIOUS SURGERY: Chronic | ICD-10-CM

## 2021-06-15 DIAGNOSIS — K80.50 CALCULUS OF BILE DUCT WITHOUT CHOLANGITIS OR CHOLECYSTITIS WITHOUT OBSTRUCTION: ICD-10-CM

## 2021-06-15 PROCEDURE — 36415 COLL VENOUS BLD VENIPUNCTURE: CPT

## 2021-06-15 PROCEDURE — 80053 COMPREHEN METABOLIC PANEL: CPT

## 2021-06-15 PROCEDURE — G0463: CPT

## 2021-06-15 PROCEDURE — 99213 OFFICE O/P EST LOW 20 MIN: CPT | Mod: GC

## 2021-06-15 NOTE — REVIEW OF SYSTEMS
[Fever] : no fever [Chills] : no chills [Feeling Poorly] : not feeling poorly [Feeling Tired] : not feeling tired [Eyesight Problems] : no eyesight problems [Discharge From Eyes] : no purulent discharge from the eyes [Nosebleeds] : no nosebleeds [Nasal Discharge] : no nasal discharge [Chest Pain] : no chest pain [Palpitations] : no palpitations [Shortness Of Breath] : no shortness of breath [Wheezing] : no wheezing [Cough] : no cough [Abdominal Pain] : no abdominal pain [SOB on Exertion] : no shortness of breath during exertion [Vomiting] : no vomiting [Constipation] : no constipation [Pelvic Pain] : no pelvic pain [Dysmenorrhea] : no dysmenorrhea [Arthralgias] : no arthralgias [Joint Swelling] : no joint swelling [Joint Stiffness] : no joint stiffness [Itching] : no itching [Change In A Mole] : no change in a mole [Dizziness] : no dizziness [Fainting] : no fainting [Anxiety] : no anxiety [Depression] : no depression [Muscle Weakness] : no muscle weakness

## 2021-06-15 NOTE — END OF VISIT
[] : Fellow [FreeTextEntry3] : As modified and discussed with patient\par MD [facility]\par Associate Professor of Medicine\par [facility] [employer] [facility] [employer]\par

## 2021-06-15 NOTE — ASSESSMENT
[FreeTextEntry1] : Impression:\par # Abdominal Pain: Concerning for biliary pathology given elevated liver enzymes and cholelithiasis as well as characterization of pain\par \par Plan:\par - Schedule for MRCP to rule out choledocholithiasis\par - Gen Sx referral for cholecystectomy\par - Repeat CMP today\par \par D/w Dr. Bueno

## 2021-06-15 NOTE — HISTORY OF PRESENT ILLNESS
[de-identified] : 61 F hx of CML on imatinib, H. pylori gastritis (s/p eradication), HTN, and morbid obesity here for follow up after ED visit.\par \par  serviced provided via [employer] ID number [id number]\par \par Patient currently feels well. Went to the ED 5/25 for severe abdominal pain that she describes as 10/10, nonradiating, constant and lasting for 1-2 hours. She went to the ED with blood work showing mildly elevated Alk Phos (182), ALT (136) and AST (265) with normal bilirubin. US also showed cholelithiasis. She reports she was given a PPI and sent home with improvement of her symptoms.

## 2021-06-15 NOTE — PHYSICAL EXAM
[General Appearance - Alert] : alert [General Appearance - In No Acute Distress] : in no acute distress [General Appearance - Well Nourished] : well nourished [Sclera] : the sclera and conjunctiva were normal [Extraocular Movements] : extraocular movements were intact [Oropharynx] : the oropharynx was normal [Examination Of The Oral Cavity] : the lips and gums were normal [Neck Appearance] : the appearance of the neck was normal [Neck Cervical Mass (___cm)] : no neck mass was observed [Respiration, Rhythm And Depth] : normal respiratory rhythm and effort [Auscultation Breath Sounds / Voice Sounds] : lungs were clear to auscultation bilaterally [Heart Rate And Rhythm] : heart rate was normal and rhythm regular [Heart Sounds] : normal S1 and S2 [Edema] : there was no peripheral edema [Bowel Sounds] : normal bowel sounds [Abdomen Soft] : soft [Abdomen Tenderness] : non-tender [No CVA Tenderness] : no ~M costovertebral angle tenderness [No Spinal Tenderness] : no spinal tenderness [Abnormal Walk] : normal gait [Nail Clubbing] : no clubbing  or cyanosis of the fingernails [Musculoskeletal - Swelling] : no joint swelling seen [Skin Color & Pigmentation] : normal skin color and pigmentation [] : no rash [Skin Lesions] : no skin lesions [Sensation] : the sensory exam was normal to light touch and pinprick [Motor Exam] : the motor exam was normal [No Focal Deficits] : no focal deficits [Oriented To Time, Place, And Person] : oriented to person, place, and time [Affect] : the affect was normal [Mood] : the mood was normal

## 2021-06-16 LAB
ALBUMIN SERPL ELPH-MCNC: 4.5 G/DL — SIGNIFICANT CHANGE UP (ref 3.3–5)
ALP SERPL-CCNC: 92 U/L — SIGNIFICANT CHANGE UP (ref 40–120)
ALT FLD-CCNC: 16 U/L — SIGNIFICANT CHANGE UP (ref 10–45)
ANION GAP SERPL CALC-SCNC: 12 MMOL/L — SIGNIFICANT CHANGE UP (ref 5–17)
AST SERPL-CCNC: 23 U/L — SIGNIFICANT CHANGE UP (ref 10–40)
BILIRUB SERPL-MCNC: 0.2 MG/DL — SIGNIFICANT CHANGE UP (ref 0.2–1.2)
BUN SERPL-MCNC: 16 MG/DL — SIGNIFICANT CHANGE UP (ref 7–23)
CALCIUM SERPL-MCNC: 9.5 MG/DL — SIGNIFICANT CHANGE UP (ref 8.4–10.5)
CHLORIDE SERPL-SCNC: 103 MMOL/L — SIGNIFICANT CHANGE UP (ref 96–108)
CO2 SERPL-SCNC: 26 MMOL/L — SIGNIFICANT CHANGE UP (ref 22–31)
CREAT SERPL-MCNC: 0.69 MG/DL — SIGNIFICANT CHANGE UP (ref 0.5–1.3)
GLUCOSE SERPL-MCNC: 103 MG/DL — HIGH (ref 70–99)
POTASSIUM SERPL-MCNC: 4 MMOL/L — SIGNIFICANT CHANGE UP (ref 3.5–5.3)
POTASSIUM SERPL-SCNC: 4 MMOL/L — SIGNIFICANT CHANGE UP (ref 3.5–5.3)
PROT SERPL-MCNC: 6.9 G/DL — SIGNIFICANT CHANGE UP (ref 6–8.3)
SODIUM SERPL-SCNC: 141 MMOL/L — SIGNIFICANT CHANGE UP (ref 135–145)

## 2021-06-22 ENCOUNTER — OUTPATIENT (OUTPATIENT)
Dept: OUTPATIENT SERVICES | Facility: HOSPITAL | Age: 61
LOS: 1 days | Discharge: HOME | End: 2021-06-22

## 2021-06-22 DIAGNOSIS — Z98.891 HISTORY OF UTERINE SCAR FROM PREVIOUS SURGERY: Chronic | ICD-10-CM

## 2021-06-22 DIAGNOSIS — C92.10 CHRONIC MYELOID LEUKEMIA, BCR/ABL-POSITIVE, NOT HAVING ACHIEVED REMISSION: ICD-10-CM

## 2021-06-25 ENCOUNTER — APPOINTMENT (OUTPATIENT)
Dept: HEMATOLOGY ONCOLOGY | Facility: CLINIC | Age: 61
End: 2021-06-25
Payer: COMMERCIAL

## 2021-06-25 NOTE — HISTORY OF PRESENT ILLNESS
[Disease:__________________________] : Disease: [unfilled] [de-identified] : 59yo F originally from [facility] with a history of HTN who was admitted to [facility] after her WBC on outpatient labs was >80k. She was asymptomatic at the time. Her flow cytometry had 1.2% myeloblasts (HLA-DR , CD 33, CD 34,  positive). BCR-ABL was positive (>10%) and JAK2 was negative. The patient had CT scans of chest/abdomen/pelvis showed splenomegaly (14 cm) and an aortocaval node  measuring 1.9 x 1.4 cm. Bone marrow biopsy done 10/16. Imatinib 400 mg daily initiated. She is intermediate-high risk based on Sokal and Hasford risk calculations. Treatment course was complicated by imatinib-induced lichen planus (biopsy proven) which is controlled with topical medications while continuing on the imatinib. [de-identified] : intermediate risk  [Therapy: ___] : Therapy: [unfilled] [FreeTextEntry1] : imatinib  [de-identified] :  Patient is on Imatinib 400 mg daily, tolerated well. \par \par BCR/ABL 10/26/2020:  Not detected. \par \par Patient complaints of pain in the occipital area, that prevents her from doing activities of daily living. \par \par No other changes in her medical, surgical or social history since 10/19/2020.  [___________________________________] : Drug: [unfilled] [de-identified] : lichen planus (biopsy proven, 8/31/18) [de-identified] : Dermatologic: [TWNoteComboBox3] : Grade: 1 [0 - No Distress] : Distress Level: 0 [Home] : at home, [unfilled] , at the time of the visit. [Medical Office: ([facility])___] : at ~his/her~ medical office located in [place] [Family Member] : family member [Patient] : the patient [Self] : self [FreeTextEntry2] : [place]don[place] [FreeTextEntry4] : relative

## 2021-06-25 NOTE — PHYSICAL EXAM
[Restricted in physically strenuous activity but ambulatory and able to carry out work of a light or sedentary nature] : Status 1- Restricted in physically strenuous activity but ambulatory and able to carry out work of a light or sedentary nature, e.g., light house work, office work [Obese] : obese [Thrush] : no thrush [Normal] : affect appropriate [de-identified] : diffuse hyperpigmentation of the skin in the face, arms,  legs,  abdomen, buttocks, back. Some of the patches are lighter than others specially in the back.  [de-identified] : Headaches located to the occipital lobe.

## 2021-06-25 NOTE — REVIEW OF SYSTEMS
[Fever] : no fever [Fatigue] : no fatigue [Recent Change In Weight] : ~T no recent weight change [Vision Problems] : no vision problems [Dysphagia] : no dysphagia [Chest Pain] : no chest pain [Lower Ext Edema] : no lower extremity edema [Cough] : no cough [Abdominal Pain] : no abdominal pain [Constipation] : no constipation [Diarrhea] : no diarrhea [Joint Stiffness] : no joint stiffness [Muscle Pain] : no muscle pain [Skin Rash] : skin rash [Difficulty Walking] : no difficulty walking [Anxiety] : anxiety [Easy Bleeding] : no tendency for easy bleeding [Easy Bruising] : no tendency for easy bruising [Swollen Glands] : no swollen glands [Negative] : Allergic/Immunologic [de-identified] : diffuse rash.  [de-identified] : headaches

## 2021-06-25 NOTE — REASON FOR VISIT
[Follow-Up Visit] : a follow-up visit for [Chronic Leukemia] : chronic leukemia [[employer] ] : provided by [employer]   [FreeTextEntry2] : CML

## 2021-06-25 NOTE — ASSESSMENT
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 \par \par 1. CML, chronic phase: asymptomatic at time of diagnosis September 2017 (WBC 83, ). bcr-abl >10% with negative JAK2. Bone marrow biopsy (10/16/2017) with minimal specimen but enough to confirm CML. Imatinib 400mg daily was initiated. Her quant bcr-abl has been followed at 3 month intervals along with her CBC. \par - has had complete hematologic response since initiation of imatinib. \par -bcr-abl initially improving until imatinib was held for evaluation of her eventually diagnosed lichen planus. Once the imatinib was restarted her bcr-abl started to downtrend.\par BCR-ABL 10/19/2020- not detectable.  \par \par 2. 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 \par -There are several case reports in the literature of lichen planus associated with imatinib. Studies have shown skin toxicity in up 17% (all grade) at the 400mg dose. \par -Rash is steadily improving with each visit and does not interfere with her daily activities. she reports no longer taking most of the prescribed but OTC creams as needed.\par \par New onset of headaches for few months worsening lately localized to the occipital lobe; MRI of the brain was ordered.\par \par This service was provided by using telehealth. [place] [facility] [place] requested and participated in this encounter. The encounter face to face last 30   minutes coordinating his/her care and counseling.\par \par \par RTC 4 months.

## 2021-06-29 ENCOUNTER — APPOINTMENT (OUTPATIENT)
Dept: HEMATOLOGY ONCOLOGY | Facility: CLINIC | Age: 61
End: 2021-06-29
Payer: COMMERCIAL

## 2021-06-29 DIAGNOSIS — Z86.79 PERSONAL HISTORY OF OTHER DISEASES OF THE CIRCULATORY SYSTEM: ICD-10-CM

## 2021-06-29 DIAGNOSIS — C92.10 CHRONIC MYELOID LEUKEMIA, BCR/ABL-POSITIVE, NOT HAVING ACHIEVED REMISSION: ICD-10-CM

## 2021-06-29 PROCEDURE — 99214 OFFICE O/P EST MOD 30 MIN: CPT | Mod: 95

## 2021-06-29 RX ORDER — LISINOPRIL 40 MG/1
40 TABLET ORAL
Qty: 60 | Refills: 3 | Status: ACTIVE | COMMUNITY
Start: 2017-09-29 | End: 1900-01-01

## 2021-06-29 NOTE — HISTORY OF PRESENT ILLNESS
[Disease:__________________________] : Disease: [unfilled] [Therapy: ___] : Therapy: [unfilled] [___________________________________] : Drug: [unfilled] [0 - No Distress] : Distress Level: 0 [Home] : at home, [unfilled] , at the time of the visit. [Medical Office: ([facility])___] : at ~his/her~ medical office located in [place] [Family Member] : family member [Patient] : the patient [Self] : self [de-identified] : 62yo F originally from [facility] with a history of HTN who was admitted to [facility] after her WBC on outpatient labs was >80k. She was asymptomatic at the time. Her flow cytometry had 1.2% myeloblasts (HLA-DR , CD 33, CD 34,  positive). BCR-ABL was positive (>10%) and JAK2 was negative. The patient had CT scans of chest/abdomen/pelvis showed splenomegaly (14 cm) and an aortocaval node  measuring 1.9 x 1.4 cm. Bone marrow biopsy done 10/16. Imatinib 400 mg daily initiated. She is intermediate-high risk based on Sokal and Hasford risk calculations. Treatment course was complicated by imatinib-induced lichen planus (biopsy proven) which is controlled with topical medications while continuing on the imatinib. [de-identified] : intermediate risk  [FreeTextEntry1] : imatinib  [de-identified] :  Patient is on Imatinib 400 mg daily, tolerated well. \par \par BCR/ABL 3/5/2021:  0.001%\par \par Patient moved to [place] on 6/28/21. \par \par No other changes in her medical, surgical or social history since 2/19/2021.  [de-identified] : lichen planus (biopsy proven, 8/31/18) [de-identified] : Dermatologic: [TWNoteComboBox3] : Grade: 1 [FreeTextEntry2] : [place]donado [FreeTextEntry4] : relative

## 2021-06-29 NOTE — ASSESSMENT
[FreeTextEntry1] : 60yo F with a history of HTN with chronic phase CML on Imatinib. \par \par 1. CML, chronic phase: asymptomatic at time of diagnosis September 2017 (WBC 83, ). bcr-abl >10% with negative JAK2. Bone marrow biopsy (10/16/2017) with minimal specimen but enough to confirm CML. Imatinib 400mg daily was initiated. Her quant bcr-abl has been followed at 3 month intervals along with her CBC. \par - has had complete hematologic response since initiation of imatinib. \par -bcr-abl initially improving until imatinib was held for evaluation of her eventually diagnosed lichen planus. Once the imatinib was restarted her bcr-abl started to downtrend.\par BCR/ABL 3/5/2021:  0.001%\par \par 2. 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 \par -There are several case reports in the literature of lichen planus associated with imatinib. Studies have shown skin toxicity in up 17% (all grade) at the 400mg dose. \par -Rash is steadily improving with each visit and does not interfere with her daily activities. she reports no longer taking most of the prescribed but OTC creams as needed.\par \par Patient moved to [place] and will be transferring her care to a hematologist in [place].  \par \par This service was provided by using telehealth. The patient was at home and I was at [facility] [place]. The patient requested and participated in this encounter. The encounter face to face last 30   minutes coordinating his/her care and counseling.\par \par \par RTC 4 months.

## 2021-06-29 NOTE — REVIEW OF SYSTEMS
[Skin Rash] : skin rash [Anxiety] : anxiety [Negative] : Allergic/Immunologic [Fever] : no fever [Fatigue] : no fatigue [Recent Change In Weight] : ~T no recent weight change [Vision Problems] : no vision problems [Dysphagia] : no dysphagia [Chest Pain] : no chest pain [Lower Ext Edema] : no lower extremity edema [Cough] : no cough [Abdominal Pain] : no abdominal pain [Constipation] : no constipation [Diarrhea] : no diarrhea [Joint Stiffness] : no joint stiffness [Muscle Pain] : no muscle pain [Difficulty Walking] : no difficulty walking [Easy Bleeding] : no tendency for easy bleeding [Easy Bruising] : no tendency for easy bruising [Swollen Glands] : no swollen glands [de-identified] : diffuse rash.  [de-identified] : headaches

## 2021-06-29 NOTE — PHYSICAL EXAM
[Restricted in physically strenuous activity but ambulatory and able to carry out work of a light or sedentary nature] : Status 1- Restricted in physically strenuous activity but ambulatory and able to carry out work of a light or sedentary nature, e.g., light house work, office work [Obese] : obese [Normal] : affect appropriate [Thrush] : no thrush

## 2023-01-09 NOTE — PROGRESS NOTE ADULT - ASSESSMENT
[FreeTextEntry1] : 64 y..o female with multiple CAD risk factors: DM, HTN, hyperlipidemia, FH of CAD.\par ? h/o mitral valve prolapse.\par ? h/o cardiac arrhythmia.\par Syncope/presyncope episodes.\par Episodes of palpitations and SOB.\par Abnormal ECG.\par \par Plan:\par - will obtain TTE to r/o structural and functional abnormalities.\par - will place Holter monitor for 7 days.\par - ischemia work up after TTE results\par - will obtain blood work results from PCP\par \par F/U after testing.\par \par Hector Harris MD\par \par  She is a [place] speaking 58 y/o F who had incidental finding of leucocytosis at PCP office and referred to hospital for further evaluation. 58 y/o F with h/o HTN, sent to hospital for incidental finding of leukocytosis ~100K, asymptomatic

## 2023-11-14 NOTE — PATIENT PROFILE ADULT. - NS TRANSFER PATIENT BELONGINGS
Patient scheduled screening colonoscopy 01/03/24 with sandra Cramer bowel prep ordered and instructions mailed today.  
Screening Questions for the Scheduling of Screening Colonoscopies     (If Colonoscopy is diagnostic, Provider should review the chart before scheduling.)    Are you younger than 50 or older than 80?  No    Do you take aspirin or fish oil?  No (if yes, tell patient to stop 1 week prior to Colonoscopy)    Do you take warfarin (Coumadin), clopidogrel (Plavix), apixaban (Eliquis), dabigatram (Pradaxa), rivaroxaban (Xarelto) or any blood thinner? No    Do you use oxygen at home?  No    Do you have kidney disease? No    Are you on dialysis? No    Have you had a stroke or heart attack in the last year? No    Have you had a stent in your heart or any blood vessel in the last year? No    Have you had a transplant of any organ?  No    Have you had a colonoscopy or upper endoscopy (EGD) before?  No         Date of scheduled Colonoscopy: 1/3/24    Provider: Dr. Penn     [facility]  
None

## 2024-03-12 RX ORDER — IMATINIB MESYLATE 400 MG/1
400 TABLET, FILM COATED ORAL DAILY
Qty: 90 | Refills: 3 | Status: ACTIVE | COMMUNITY
Start: 2017-10-16 | End: 1900-01-01